# Patient Record
Sex: FEMALE | Race: ASIAN | Employment: FULL TIME | URBAN - METROPOLITAN AREA
[De-identification: names, ages, dates, MRNs, and addresses within clinical notes are randomized per-mention and may not be internally consistent; named-entity substitution may affect disease eponyms.]

---

## 2017-08-17 ENCOUNTER — OFFICE VISIT (OUTPATIENT)
Dept: FAMILY MEDICINE CLINIC | Facility: CLINIC | Age: 52
End: 2017-08-17

## 2017-08-17 VITALS
HEART RATE: 72 BPM | WEIGHT: 126 LBS | SYSTOLIC BLOOD PRESSURE: 90 MMHG | HEIGHT: 64.5 IN | RESPIRATION RATE: 16 BRPM | DIASTOLIC BLOOD PRESSURE: 60 MMHG | TEMPERATURE: 98 F | BODY MASS INDEX: 21.25 KG/M2

## 2017-08-17 DIAGNOSIS — Z13.29 SCREENING FOR THYROID DISORDER: ICD-10-CM

## 2017-08-17 DIAGNOSIS — Z00.00 WELL ADULT EXAM: Primary | ICD-10-CM

## 2017-08-17 DIAGNOSIS — Z12.11 ENCOUNTER FOR SCREENING COLONOSCOPY: ICD-10-CM

## 2017-08-17 DIAGNOSIS — Z13.0 SCREENING FOR DEFICIENCY ANEMIA: ICD-10-CM

## 2017-08-17 DIAGNOSIS — Z13.1 SCREENING FOR DIABETES MELLITUS: ICD-10-CM

## 2017-08-17 DIAGNOSIS — Z83.49 FAMILY HISTORY OF THYROID DISORDER: ICD-10-CM

## 2017-08-17 DIAGNOSIS — Z23 NEED FOR TDAP VACCINATION: ICD-10-CM

## 2017-08-17 PROCEDURE — 99396 PREV VISIT EST AGE 40-64: CPT | Performed by: FAMILY MEDICINE

## 2017-08-17 PROCEDURE — 90471 IMMUNIZATION ADMIN: CPT | Performed by: FAMILY MEDICINE

## 2017-08-17 PROCEDURE — 90715 TDAP VACCINE 7 YRS/> IM: CPT | Performed by: FAMILY MEDICINE

## 2017-08-17 NOTE — PROGRESS NOTES
Rossana Stover is a 46year old female here for Patient presents with:  Physical: OB/GYN will order mammogram/papsmear at appt in 2 weeks      HPI:   Patient complains of here for well exam.    Patient denies any lightheadedness.     2 weeks ago had blood test daily     Social History Narrative    Works at [employer]. Has daughter graduated [employer], working in [facility] in 2017. Son in college sophomore at [place], [place]. REVIEW OF SYSTEMS:     Constitutional: no change in weight or appetite.  No fever, tenderness. BREASTS: defer to gyne  LUNGS: clear to auscultation. CARDIO: Regular rate and rhythm without murmur S3 or S4.  GI: no distention, masses, organomegaly or tenderness.   : defer to gyne  MUSCULOSKELETAL: Back and extremities within normal li CLINICAL HISTORY                   Menstrual Status:LMP 02/03/2016                                                Previous Pap:    Negative 10/24/2014                                                 Signed:   Elias ROMAN(ASCP)         03/17/16 helpful ingredients for premenopausal symptoms), it might help for hot flashes and/or sleep problems. Also melatonin 3 mg, can take between 1 pill an hour before bedtime, and 2-3 pills an hour before bedtime. 800-1000 units vitamin D daily.  Calcium 60

## 2017-08-17 NOTE — PATIENT INSTRUCTIONS
For hot flash and sleep concerns: Estroven Plus (soy and black cohosh are most helpful ingredients for premenopausal symptoms), it might help for hot flashes and/or sleep problems.     Also melatonin 3 mg, can take between 1 pill an hour before bedtime, and

## 2017-08-24 ENCOUNTER — LAB ENCOUNTER (OUTPATIENT)
Dept: LAB | Age: 52
End: 2017-08-24
Attending: FAMILY MEDICINE
Payer: COMMERCIAL

## 2017-08-24 DIAGNOSIS — Z83.49 FAMILY HISTORY OF THYROID DISORDER: ICD-10-CM

## 2017-08-24 DIAGNOSIS — Z13.0 SCREENING FOR DEFICIENCY ANEMIA: ICD-10-CM

## 2017-08-24 DIAGNOSIS — Z13.1 SCREENING FOR DIABETES MELLITUS: ICD-10-CM

## 2017-08-24 DIAGNOSIS — L50.9 URTICARIA: ICD-10-CM

## 2017-08-24 DIAGNOSIS — Z13.29 SCREENING FOR THYROID DISORDER: ICD-10-CM

## 2017-08-24 LAB
ALBUMIN SERPL-MCNC: 3.8 G/DL (ref 3.5–4.8)
ALP LIVER SERPL-CCNC: 50 U/L (ref 41–108)
ALT SERPL-CCNC: 25 U/L (ref 14–54)
AST SERPL-CCNC: 16 U/L (ref 15–41)
BASOPHILS # BLD AUTO: 0.02 X10(3) UL (ref 0–0.1)
BASOPHILS NFR BLD AUTO: 0.5 %
BILIRUB SERPL-MCNC: 1.5 MG/DL (ref 0.1–2)
BUN BLD-MCNC: 15 MG/DL (ref 8–20)
CALCIUM BLD-MCNC: 9 MG/DL (ref 8.3–10.3)
CHLORIDE: 105 MMOL/L (ref 101–111)
CO2: 28 MMOL/L (ref 22–32)
CREAT BLD-MCNC: 0.83 MG/DL (ref 0.55–1.02)
EOSINOPHIL # BLD AUTO: 0.11 X10(3) UL (ref 0–0.3)
EOSINOPHIL NFR BLD AUTO: 2.8 %
ERYTHROCYTE [DISTWIDTH] IN BLOOD BY AUTOMATED COUNT: 12 % (ref 11.5–16)
GLUCOSE BLD-MCNC: 79 MG/DL (ref 70–99)
HCT VFR BLD AUTO: 40.4 % (ref 34–50)
HGB BLD-MCNC: 13.2 G/DL (ref 12–16)
IMMATURE GRANULOCYTE COUNT: 0.01 X10(3) UL (ref 0–1)
IMMATURE GRANULOCYTE RATIO %: 0.3 %
LYMPHOCYTES # BLD AUTO: 1.26 X10(3) UL (ref 0.9–4)
LYMPHOCYTES NFR BLD AUTO: 31.5 %
M PROTEIN MFR SERPL ELPH: 7.3 G/DL (ref 6.1–8.3)
MCH RBC QN AUTO: 31.7 PG (ref 27–33.2)
MCHC RBC AUTO-ENTMCNC: 32.7 G/DL (ref 31–37)
MCV RBC AUTO: 96.9 FL (ref 81–100)
MONOCYTES # BLD AUTO: 0.41 X10(3) UL (ref 0.1–0.6)
MONOCYTES NFR BLD AUTO: 10.3 %
NEUTROPHIL ABS PRELIM: 2.19 X10 (3) UL (ref 1.3–6.7)
NEUTROPHILS # BLD AUTO: 2.19 X10(3) UL (ref 1.3–6.7)
NEUTROPHILS NFR BLD AUTO: 54.6 %
PLATELET # BLD AUTO: 162 10(3)UL (ref 150–450)
POTASSIUM SERPL-SCNC: 3.9 MMOL/L (ref 3.6–5.1)
RBC # BLD AUTO: 4.17 X10(6)UL (ref 3.8–5.1)
RED CELL DISTRIBUTION WIDTH-SD: 42.6 FL (ref 35.1–46.3)
SODIUM SERPL-SCNC: 139 MMOL/L (ref 136–144)
TSI SER-ACNC: 1.6 MIU/ML (ref 0.35–5.5)
WBC # BLD AUTO: 4 X10(3) UL (ref 4–13)

## 2017-08-24 PROCEDURE — 84443 ASSAY THYROID STIM HORMONE: CPT

## 2017-08-24 PROCEDURE — 36415 COLL VENOUS BLD VENIPUNCTURE: CPT

## 2017-08-24 PROCEDURE — 85025 COMPLETE CBC W/AUTO DIFF WBC: CPT

## 2017-08-24 PROCEDURE — 80053 COMPREHEN METABOLIC PANEL: CPT

## 2017-11-11 ENCOUNTER — OFFICE VISIT (OUTPATIENT)
Dept: OBGYN CLINIC | Facility: CLINIC | Age: 52
End: 2017-11-11

## 2017-11-11 VITALS
BODY MASS INDEX: 21.16 KG/M2 | TEMPERATURE: 99 F | HEART RATE: 68 BPM | SYSTOLIC BLOOD PRESSURE: 94 MMHG | DIASTOLIC BLOOD PRESSURE: 68 MMHG | RESPIRATION RATE: 12 BRPM | HEIGHT: 65 IN | WEIGHT: 127 LBS

## 2017-11-11 DIAGNOSIS — Z11.51 SCREENING FOR HUMAN PAPILLOMAVIRUS: ICD-10-CM

## 2017-11-11 DIAGNOSIS — Z01.419 ENCOUNTER FOR ANNUAL ROUTINE GYNECOLOGICAL EXAMINATION: Primary | ICD-10-CM

## 2017-11-11 DIAGNOSIS — Z12.39 BREAST CANCER SCREENING: ICD-10-CM

## 2017-11-11 PROCEDURE — 87624 HPV HI-RISK TYP POOLED RSLT: CPT | Performed by: OBSTETRICS & GYNECOLOGY

## 2017-11-11 PROCEDURE — 99396 PREV VISIT EST AGE 40-64: CPT | Performed by: OBSTETRICS & GYNECOLOGY

## 2017-11-11 PROCEDURE — 88175 CYTOPATH C/V AUTO FLUID REDO: CPT | Performed by: OBSTETRICS & GYNECOLOGY

## 2017-11-11 NOTE — PROGRESS NOTES
HPI:   Fariba Zazueta is a 46year old [id number] who presents for an annual gynecological exam.   Menses: Patient's last menstrual period was 07/04/2017 (exact date).  Menopause: postmenopausal    No bleeding since 7/2017    No current outpatient prescriptions on symmetric  THYROID: No masses, no thyromegaly  BREASTS: Normal inspection, no dominant masses on palpation, no lymphadenopathy  CV: Regular rate and rhythm  LUNGS: Clear to auscultation bilaterally, good air entry throughout  ABD: Soft, nontender and not d learning. ASSESSMENT AND PLAN:   Jenny Velasquez is a 46year old female who presents for an annual gynecological exam.        Normal exam.  Pap done. Mammogram ordered.   Screening colonoscopy current done last month    Cholesterol and screening blood wor

## 2018-01-20 ENCOUNTER — HOSPITAL ENCOUNTER (OUTPATIENT)
Dept: MAMMOGRAPHY | Age: 53
Discharge: HOME | End: 2018-01-20
Attending: OBSTETRICS & GYNECOLOGY
Payer: COMMERCIAL

## 2018-01-20 DIAGNOSIS — Z12.39 BREAST CANCER SCREENING: ICD-10-CM

## 2018-01-20 PROCEDURE — 77063 BREAST TOMOSYNTHESIS BI: CPT | Performed by: OBSTETRICS & GYNECOLOGY

## 2018-01-20 PROCEDURE — 77067 SCR MAMMO BI INCL CAD: CPT | Performed by: OBSTETRICS & GYNECOLOGY

## 2018-03-28 ENCOUNTER — PATIENT OUTREACH (OUTPATIENT)
Dept: FAMILY MEDICINE CLINIC | Facility: CLINIC | Age: 53
End: 2018-03-28

## 2018-03-28 NOTE — PROGRESS NOTES
Called pt to verify is she had a recent colonoscopy done. Pt stated that she had one done last year at [address] [place] [address]. Will call to get records faxed over.

## 2018-08-17 ENCOUNTER — OFFICE VISIT (OUTPATIENT)
Dept: FAMILY MEDICINE CLINIC | Facility: CLINIC | Age: 53
End: 2018-08-17

## 2018-08-17 VITALS
OXYGEN SATURATION: 98 % | HEART RATE: 66 BPM | DIASTOLIC BLOOD PRESSURE: 58 MMHG | HEIGHT: 65 IN | TEMPERATURE: 98 F | BODY MASS INDEX: 20.39 KG/M2 | WEIGHT: 122.38 LBS | RESPIRATION RATE: 15 BRPM | SYSTOLIC BLOOD PRESSURE: 90 MMHG

## 2018-08-17 DIAGNOSIS — R10.13 EPIGASTRIC PAIN: Primary | ICD-10-CM

## 2018-08-17 DIAGNOSIS — G89.29 CHRONIC LUQ PAIN: ICD-10-CM

## 2018-08-17 DIAGNOSIS — E06.5 CHRONIC THYROIDITIS: ICD-10-CM

## 2018-08-17 DIAGNOSIS — Z13.0 SCREENING FOR DEFICIENCY ANEMIA: ICD-10-CM

## 2018-08-17 DIAGNOSIS — Z86.39 H/O THYROID NODULE: ICD-10-CM

## 2018-08-17 DIAGNOSIS — Z13.220 SCREENING, LIPID: ICD-10-CM

## 2018-08-17 DIAGNOSIS — R10.12 CHRONIC LUQ PAIN: ICD-10-CM

## 2018-08-17 DIAGNOSIS — R53.82 CHRONIC FATIGUE: ICD-10-CM

## 2018-08-17 DIAGNOSIS — Z13.1 SCREENING FOR DIABETES MELLITUS: ICD-10-CM

## 2018-08-17 PROCEDURE — 99214 OFFICE O/P EST MOD 30 MIN: CPT | Performed by: FAMILY MEDICINE

## 2018-08-17 NOTE — PATIENT INSTRUCTIONS
Recommend scheduling followup after labs are done. Exam is normal, I doubt any serious problems. We could consider abdominal ultrasound to check for gallbladder disease and it will also rule out spleen enlargement.      Recheck in 10 days to review labs

## 2018-08-17 NOTE — PROGRESS NOTES
Moncho Ybarra IS A 48year old female [address] [facility] Patient presents with:  Abdominal Pain: RUQ. Sympt started about two months ago        History of present illness:     2 months ago c/o LUQ pain below lower end of rib cage.  Noted it initially after bending over Exposure No    Hobby Hazards No    Sleep Concern Yes    Comment: occasional early awakening 5AM    Stress Concern No    Weight Concern No    Special Diet No    Comment: higher salt.      Exercise Yes    Comment: walks an hour & 40 minutes daily     Social H PEROXIDASE AND THYROGLOBULIN ANTIBODIES; Future    Chronic fatigue  -     SED RATE, WESTERGREN (AUTOMATED);  Future  -     C-REACTIVE PROTEIN; Future  -     ASSAY, THYROID STIM HORMONE; Future    Screening for diabetes mellitus  -     COMP METABOLIC PANEL (

## 2018-08-22 ENCOUNTER — LAB ENCOUNTER (OUTPATIENT)
Dept: LAB | Age: 53
End: 2018-08-22
Attending: FAMILY MEDICINE
Payer: COMMERCIAL

## 2018-08-22 DIAGNOSIS — Z13.0 SCREENING FOR DEFICIENCY ANEMIA: ICD-10-CM

## 2018-08-22 DIAGNOSIS — Z13.220 SCREENING, LIPID: ICD-10-CM

## 2018-08-22 DIAGNOSIS — R53.82 CHRONIC FATIGUE: ICD-10-CM

## 2018-08-22 DIAGNOSIS — Z13.1 SCREENING FOR DIABETES MELLITUS: ICD-10-CM

## 2018-08-22 DIAGNOSIS — E06.5 CHRONIC THYROIDITIS: ICD-10-CM

## 2018-08-22 LAB
ALBUMIN SERPL-MCNC: 4.1 G/DL (ref 3.5–4.8)
ALBUMIN/GLOB SERPL: 1.2 {RATIO} (ref 1–2)
ALP LIVER SERPL-CCNC: 63 U/L (ref 41–108)
ALT SERPL-CCNC: 28 U/L (ref 14–54)
ANION GAP SERPL CALC-SCNC: 9 MMOL/L (ref 0–18)
AST SERPL-CCNC: 20 U/L (ref 15–41)
BASOPHILS # BLD AUTO: 0.02 X10(3) UL (ref 0–0.1)
BASOPHILS NFR BLD AUTO: 0.6 %
BILIRUB SERPL-MCNC: 1.3 MG/DL (ref 0.1–2)
BUN BLD-MCNC: 17 MG/DL (ref 8–20)
BUN/CREAT SERPL: 18.5 (ref 10–20)
CALCIUM BLD-MCNC: 9.2 MG/DL (ref 8.3–10.3)
CHLORIDE SERPL-SCNC: 107 MMOL/L (ref 101–111)
CHOLEST SMN-MCNC: 172 MG/DL (ref ?–200)
CO2 SERPL-SCNC: 26 MMOL/L (ref 22–32)
CREAT BLD-MCNC: 0.92 MG/DL (ref 0.55–1.02)
CRP SERPL-MCNC: <0.29 MG/DL (ref ?–1)
EOSINOPHIL # BLD AUTO: 0.09 X10(3) UL (ref 0–0.3)
EOSINOPHIL NFR BLD AUTO: 2.6 %
ERYTHROCYTE [DISTWIDTH] IN BLOOD BY AUTOMATED COUNT: 11.9 % (ref 11.5–16)
GLOBULIN PLAS-MCNC: 3.5 G/DL (ref 2.5–4)
GLUCOSE BLD-MCNC: 84 MG/DL (ref 70–99)
HCT VFR BLD AUTO: 44.3 % (ref 34–50)
HDLC SERPL-MCNC: 68 MG/DL (ref 40–59)
HGB BLD-MCNC: 14.1 G/DL (ref 12–16)
IMMATURE GRANULOCYTE COUNT: 0.01 X10(3) UL (ref 0–1)
IMMATURE GRANULOCYTE RATIO %: 0.3 %
LDLC SERPL CALC-MCNC: 84 MG/DL (ref ?–100)
LYMPHOCYTES # BLD AUTO: 1.12 X10(3) UL (ref 0.9–4)
LYMPHOCYTES NFR BLD AUTO: 32.2 %
M PROTEIN MFR SERPL ELPH: 7.6 G/DL (ref 6.1–8.3)
MCH RBC QN AUTO: 30.5 PG (ref 27–33.2)
MCHC RBC AUTO-ENTMCNC: 31.8 G/DL (ref 31–37)
MCV RBC AUTO: 95.9 FL (ref 81–100)
MONOCYTES # BLD AUTO: 0.32 X10(3) UL (ref 0.1–1)
MONOCYTES NFR BLD AUTO: 9.2 %
NEUTROPHIL ABS PRELIM: 1.92 X10 (3) UL (ref 1.3–6.7)
NEUTROPHILS # BLD AUTO: 1.92 X10(3) UL (ref 1.3–6.7)
NEUTROPHILS NFR BLD AUTO: 55.1 %
NONHDLC SERPL-MCNC: 104 MG/DL (ref ?–130)
OSMOLALITY SERPL CALC.SUM OF ELEC: 295 MOSM/KG (ref 275–295)
PLATELET # BLD AUTO: 171 10(3)UL (ref 150–450)
POTASSIUM SERPL-SCNC: 4.2 MMOL/L (ref 3.6–5.1)
RBC # BLD AUTO: 4.62 X10(6)UL (ref 3.8–5.1)
RED CELL DISTRIBUTION WIDTH-SD: 41.5 FL (ref 35.1–46.3)
SED RATE-ML: 7 MM/HR (ref 0–25)
SODIUM SERPL-SCNC: 142 MMOL/L (ref 136–144)
THYROGLOB SERPL-MCNC: 92 U/ML (ref ?–60)
THYROPEROXIDASE AB SERPL-ACNC: 101 U/ML (ref ?–60)
TRIGL SERPL-MCNC: 100 MG/DL (ref 30–149)
TSI SER-ACNC: 1.37 MIU/ML (ref 0.35–5.5)
VLDLC SERPL CALC-MCNC: 20 MG/DL (ref 0–30)
WBC # BLD AUTO: 3.5 X10(3) UL (ref 4–13)

## 2018-08-22 PROCEDURE — 86376 MICROSOMAL ANTIBODY EACH: CPT

## 2018-08-22 PROCEDURE — 86800 THYROGLOBULIN ANTIBODY: CPT

## 2018-08-22 PROCEDURE — 85025 COMPLETE CBC W/AUTO DIFF WBC: CPT

## 2018-08-22 PROCEDURE — 84443 ASSAY THYROID STIM HORMONE: CPT

## 2018-08-22 PROCEDURE — 80061 LIPID PANEL: CPT

## 2018-08-22 PROCEDURE — 80053 COMPREHEN METABOLIC PANEL: CPT

## 2018-08-22 PROCEDURE — 86140 C-REACTIVE PROTEIN: CPT

## 2018-08-22 PROCEDURE — 36415 COLL VENOUS BLD VENIPUNCTURE: CPT

## 2018-08-22 PROCEDURE — 85652 RBC SED RATE AUTOMATED: CPT

## 2018-08-24 ENCOUNTER — HOSPITAL ENCOUNTER (OUTPATIENT)
Dept: ULTRASOUND IMAGING | Age: 53
Discharge: HOME | End: 2018-08-24
Attending: FAMILY MEDICINE
Payer: COMMERCIAL

## 2018-08-24 DIAGNOSIS — R10.13 EPIGASTRIC PAIN: ICD-10-CM

## 2018-08-24 DIAGNOSIS — E06.5 CHRONIC THYROIDITIS: ICD-10-CM

## 2018-08-24 DIAGNOSIS — Z86.39 H/O THYROID NODULE: ICD-10-CM

## 2018-08-24 DIAGNOSIS — R10.12 CHRONIC LUQ PAIN: ICD-10-CM

## 2018-08-24 DIAGNOSIS — G89.29 CHRONIC LUQ PAIN: ICD-10-CM

## 2018-08-24 PROCEDURE — 76536 US EXAM OF HEAD AND NECK: CPT | Performed by: FAMILY MEDICINE

## 2018-08-24 PROCEDURE — 76700 US EXAM ABDOM COMPLETE: CPT | Performed by: FAMILY MEDICINE

## 2018-10-23 ENCOUNTER — TELEPHONE (OUTPATIENT)
Dept: FAMILY MEDICINE CLINIC | Facility: CLINIC | Age: 53
End: 2018-10-23

## 2018-10-23 NOTE — TELEPHONE ENCOUNTER
Notify pt I heard from GI/liver specialist finally who responded to a note I had sent after her visit, in August. Please ask pt Is she having any more left upper abdominal symptoms?  From specialist's note, the minor liver abnormality (which from my underst

## 2018-10-23 NOTE — TELEPHONE ENCOUNTER
Attempted to call patient. Detailed ML inquiring if she is having any left upper abdominal symptoms. Informed of information given by liver specialist per 's note.   Contact information give for Dr. Parker Mckeon if she wants to call and schedule appoi

## 2019-03-13 ENCOUNTER — TELEPHONE (OUTPATIENT)
Dept: FAMILY MEDICINE CLINIC | Facility: CLINIC | Age: 54
End: 2019-03-13

## 2019-03-13 ENCOUNTER — OFFICE VISIT (OUTPATIENT)
Dept: FAMILY MEDICINE CLINIC | Facility: CLINIC | Age: 54
End: 2019-03-13

## 2019-03-13 VITALS
SYSTOLIC BLOOD PRESSURE: 100 MMHG | WEIGHT: 128.63 LBS | DIASTOLIC BLOOD PRESSURE: 70 MMHG | RESPIRATION RATE: 16 BRPM | TEMPERATURE: 99 F | HEART RATE: 63 BPM | OXYGEN SATURATION: 99 % | BODY MASS INDEX: 21 KG/M2

## 2019-03-13 DIAGNOSIS — H60.501 ACUTE OTITIS EXTERNA OF RIGHT EAR, UNSPECIFIED TYPE: Primary | ICD-10-CM

## 2019-03-13 PROCEDURE — 99213 OFFICE O/P EST LOW 20 MIN: CPT | Performed by: NURSE PRACTITIONER

## 2019-03-13 RX ORDER — NEOMYCIN SULFATE, POLYMYXIN B SULFATE AND HYDROCORTISONE 10; 3.5; 1 MG/ML; MG/ML; [USP'U]/ML
4 SUSPENSION/ DROPS AURICULAR (OTIC) 4 TIMES DAILY
Qty: 1 BOTTLE | Refills: 0 | Status: SHIPPED | OUTPATIENT
Start: 2019-03-13 | End: 2019-03-20

## 2019-03-13 NOTE — PROGRESS NOTES
CHIEF COMPLAINT:   Patient presents with:  Ear Problem: headache, RT ear pain, tired, x 1 day      HPI:   Ashley Mercado is a 48year old female who presents to clinic today with complaints of right ear pain. Has had for 1  days.  Pain is described as pressur EARS: right tragus tender on palpation. Right external auditory canals erythemic with moderate edema, right TM partially obscured due to canal edema. Right TM: clear, no bulging, no retraction, small clear effusion, bony landmarks visible.   Left TM: clear, Symptoms can include pain, fever, itching, redness, drainage, or swelling of the ear canal. Temporary hearing loss may also occur.   Home care  · Do not try to clean the ear canal. This can push pus and bacteria deeper into the canal.  · Use prescribed ear © 3852-7982 [address]. [address] [facility], [address] [place]. All rights reserved. This information is not intended as a substitute for professional medical care. Always follow your healthcare professional's instructions.               P

## 2019-04-07 ENCOUNTER — OFFICE VISIT (OUTPATIENT)
Dept: FAMILY MEDICINE CLINIC | Facility: CLINIC | Age: 54
End: 2019-04-07

## 2019-04-07 VITALS
WEIGHT: 125.63 LBS | OXYGEN SATURATION: 96 % | HEIGHT: 64 IN | HEART RATE: 62 BPM | DIASTOLIC BLOOD PRESSURE: 69 MMHG | BODY MASS INDEX: 21.45 KG/M2 | SYSTOLIC BLOOD PRESSURE: 108 MMHG | TEMPERATURE: 98 F | RESPIRATION RATE: 16 BRPM

## 2019-04-07 DIAGNOSIS — H60.331 ACUTE SWIMMER'S EAR OF RIGHT SIDE: Primary | ICD-10-CM

## 2019-04-07 PROCEDURE — 99213 OFFICE O/P EST LOW 20 MIN: CPT | Performed by: PHYSICIAN ASSISTANT

## 2019-04-07 RX ORDER — OFLOXACIN 3 MG/ML
10 SOLUTION AURICULAR (OTIC) DAILY
Qty: 10 ML | Refills: 0 | Status: SHIPPED | OUTPATIENT
Start: 2019-04-07 | End: 2019-04-14

## 2019-04-07 NOTE — PATIENT INSTRUCTIONS
-Flonase  -Follow up with PCP with worsening symptoms      External Ear Infection (Adult)    External otitis (also called “swimmer’s ear”) is an infection in the ear canal. It is often caused by bacteria or fungus.  It can occur a few days after water gets your healthcare provider right away if any of these occur:  · Ear pain becomes worse or doesn’t improve after 3 days of treatment  · Redness or swelling of the outer ear occurs or gets worse  · Headache  · Painful or stiff neck  · Drowsiness or confusion

## 2019-04-07 NOTE — PROGRESS NOTES
CHIEF COMPLAINT:   Patient presents with:  Ear Problem: right ear pain x1wk      HPI:   Saadia Parker is a 48year old female who presents for right ear pain.   Patient reports she was seen in the [facility] about 3 weeks ago and was given otic drops for swimmers ear HEAD: atraumatic, normocephalic. No tenderness on palpation of maxillary sinuses. Notenderness on palpation of frontal sinuses.   EYES: conjunctiva clear, EOM intact  EARS: Left TM not erythematous, no bulging, no retraction, bony landmarks intact.  Left External otitis (also called “swimmer’s ear”) is an infection in the ear canal. It is often caused by bacteria or fungus. It can occur a few days after water gets trapped in the ear canal (from swimming or bathing).  It can also occur after cleaning too sukhwinder · Ear pain becomes worse or doesn’t improve after 3 days of treatment  · Redness or swelling of the outer ear occurs or gets worse  · Headache  · Painful or stiff neck  · Drowsiness or confusion  · Fever of 100.4ºF (38ºC) or higher, or as directed by your

## 2019-05-13 ENCOUNTER — OFFICE VISIT (OUTPATIENT)
Dept: FAMILY MEDICINE CLINIC | Facility: CLINIC | Age: 54
End: 2019-05-13

## 2019-05-13 VITALS
WEIGHT: 124.81 LBS | SYSTOLIC BLOOD PRESSURE: 120 MMHG | HEART RATE: 62 BPM | HEIGHT: 64.75 IN | RESPIRATION RATE: 16 BRPM | BODY MASS INDEX: 21.05 KG/M2 | OXYGEN SATURATION: 98 % | DIASTOLIC BLOOD PRESSURE: 68 MMHG | TEMPERATURE: 98 F

## 2019-05-13 DIAGNOSIS — H60.331 ACUTE SWIMMER'S EAR OF RIGHT SIDE: Primary | ICD-10-CM

## 2019-05-13 PROCEDURE — 99213 OFFICE O/P EST LOW 20 MIN: CPT | Performed by: FAMILY MEDICINE

## 2019-05-13 RX ORDER — CEPHALEXIN 500 MG/1
500 CAPSULE ORAL 2 TIMES DAILY
Qty: 14 CAPSULE | Refills: 0 | Status: SHIPPED | OUTPATIENT
Start: 2019-05-13 | End: 2019-06-24

## 2019-05-13 RX ORDER — OFLOXACIN 3 MG/ML
5 SOLUTION AURICULAR (OTIC) DAILY
Qty: 5 ML | Refills: 1 | Status: SHIPPED | OUTPATIENT
Start: 2019-05-13 | End: 2019-05-23

## 2019-05-13 NOTE — PROGRESS NOTES
Joe Perez IS A 48year old female [address] [place] [address] [place] Patient presents with:  Ear Pain: RT ear. x2 months        History of present illness:     Pain R ear x 2 months, went to [facility] and had drops, used it and it helped, but feels pain with sneezing.  Right side of head file        Attends [employer] service: Not on file        Active member of club or organization: Not on file        Attends meetings of clubs or organizations: Not on file        Relationship status: Not on file      Intimate partner violence:        Fear

## 2019-05-13 NOTE — PATIENT INSTRUCTIONS
For acute infection: cephalexin 500 mg twice daily for a week. Ofloxacin drops again, 5 drops once a day for 7-10 days. After current infection, try vosol (vinegar, acetic acid) drops after swimming.      Refer to ENT if the antibiotic, drops and after-

## 2019-06-24 ENCOUNTER — OFFICE VISIT (OUTPATIENT)
Dept: OBGYN CLINIC | Facility: CLINIC | Age: 54
End: 2019-06-24

## 2019-06-24 VITALS
HEIGHT: 65 IN | WEIGHT: 125 LBS | DIASTOLIC BLOOD PRESSURE: 60 MMHG | HEART RATE: 84 BPM | BODY MASS INDEX: 20.83 KG/M2 | SYSTOLIC BLOOD PRESSURE: 90 MMHG | RESPIRATION RATE: 12 BRPM | TEMPERATURE: 99 F

## 2019-06-24 DIAGNOSIS — Z12.4 SCREENING FOR MALIGNANT NEOPLASM OF THE CERVIX: ICD-10-CM

## 2019-06-24 DIAGNOSIS — Z12.39 BREAST CANCER SCREENING: ICD-10-CM

## 2019-06-24 DIAGNOSIS — N95.1 VASOMOTOR SYMPTOMS DUE TO MENOPAUSE: ICD-10-CM

## 2019-06-24 DIAGNOSIS — Z01.419 ENCOUNTER FOR ANNUAL ROUTINE GYNECOLOGICAL EXAMINATION: Primary | ICD-10-CM

## 2019-06-24 DIAGNOSIS — Z11.51 SCREENING FOR HUMAN PAPILLOMAVIRUS: ICD-10-CM

## 2019-06-24 PROCEDURE — 87624 HPV HI-RISK TYP POOLED RSLT: CPT | Performed by: OBSTETRICS & GYNECOLOGY

## 2019-06-24 PROCEDURE — 99396 PREV VISIT EST AGE 40-64: CPT | Performed by: OBSTETRICS & GYNECOLOGY

## 2019-06-24 PROCEDURE — 88175 CYTOPATH C/V AUTO FLUID REDO: CPT | Performed by: OBSTETRICS & GYNECOLOGY

## 2019-06-24 RX ORDER — ESTRADIOL AND NORETHINDRONE ACETATE 1; .5 MG/1; MG/1
1 TABLET ORAL DAILY
Qty: 90 TABLET | Refills: 0 | Status: SHIPPED | OUTPATIENT
Start: 2019-06-24 | End: 2021-11-08

## 2019-06-24 NOTE — PROGRESS NOTES
Pt here for pe/pap.   LMP:February 2018   Last pap:11/11/17 negative   Last mammogram;1/20/18 negative  Birth control:N/A

## 2019-06-24 NOTE — PROGRESS NOTES
HPI:   Kurt Jamison is a 48year old [id number] who presents for an annual gynecological exam.   Menses: No LMP recorded (approximate). (Menstrual status: Menopause).  Menopause: postmenopausal  She is having significant hot flashes and would like to have option 125 lb   LMP  (Approximate)   Breastfeeding?  No   BMI 20.80 kg/m²   GENERAL:  Well-appearing, no apparent distress, well nourished, well developed  SKIN: Normal, no rashes, no acne, no hirsutism, no ulcers  HEENT: Atraumatic, normocephalic, throat is clear baseline Dexa if menopausal.  · I encouraged pt to have yearly annual examinations with her PCP for management of general medical problems. · - I encouraged smoking cessation, if indicated.     Pt voiced understanding of all instructions; all questions ans

## 2019-06-26 RX ORDER — ESTRADIOL AND NORETHINDRONE ACETATE 1; .5 MG/1; MG/1
TABLET, FILM COATED ORAL
Qty: 90 TABLET | Refills: 0 | OUTPATIENT
Start: 2019-06-26

## 2019-07-25 ENCOUNTER — OFFICE VISIT (OUTPATIENT)
Dept: FAMILY MEDICINE CLINIC | Facility: CLINIC | Age: 54
End: 2019-07-25

## 2019-07-25 VITALS
OXYGEN SATURATION: 98 % | HEIGHT: 65 IN | WEIGHT: 123 LBS | TEMPERATURE: 98 F | BODY MASS INDEX: 20.49 KG/M2 | HEART RATE: 78 BPM | SYSTOLIC BLOOD PRESSURE: 100 MMHG | RESPIRATION RATE: 16 BRPM | DIASTOLIC BLOOD PRESSURE: 70 MMHG

## 2019-07-25 DIAGNOSIS — Z13.0 SCREENING FOR DEFICIENCY ANEMIA: ICD-10-CM

## 2019-07-25 DIAGNOSIS — G89.29 CHRONIC PAIN OF LEFT KNEE: ICD-10-CM

## 2019-07-25 DIAGNOSIS — M22.2X2 PATELLOFEMORAL PAIN SYNDROME OF LEFT KNEE: ICD-10-CM

## 2019-07-25 DIAGNOSIS — Z00.00 WELL ADULT EXAM: Primary | ICD-10-CM

## 2019-07-25 DIAGNOSIS — M25.562 CHRONIC PAIN OF LEFT KNEE: ICD-10-CM

## 2019-07-25 DIAGNOSIS — Z78.0 POSTMENOPAUSAL: ICD-10-CM

## 2019-07-25 DIAGNOSIS — Z13.220 SCREENING, LIPID: ICD-10-CM

## 2019-07-25 DIAGNOSIS — Z11.59 ENCOUNTER FOR HCV SCREENING TEST FOR LOW RISK PATIENT: ICD-10-CM

## 2019-07-25 DIAGNOSIS — Z13.29 SCREENING FOR THYROID DISORDER: ICD-10-CM

## 2019-07-25 DIAGNOSIS — Z13.1 SCREENING FOR DIABETES MELLITUS: ICD-10-CM

## 2019-07-25 PROCEDURE — 99396 PREV VISIT EST AGE 40-64: CPT | Performed by: FAMILY MEDICINE

## 2019-07-25 NOTE — PROGRESS NOTES
Angelique Newell is a 47year old female here for Patient presents with:  Physical      HPI:   Patient complains of here for well exam.     LMP Feb 2018. Had pap. Sees Dr. Sarah Crowley.  Dr. Sarah Crowley rx'd Britney Damian but pt didn't fill it, she is trying soy milk first.    Hot Attends meetings of clubs or organizations: Not on file        Relationship status: Not on file      Intimate partner violence:        Fear of current or ex partner: Not on file        Emotionally abused: Not on file        Physically abused: Not on fi without murmur S3 or S4.  GI: no distention, masses, organomegaly or tenderness. : defer gyne. MUSCULOSKELETAL: Back and extremities within normal limits except left knee crepitus, right is normal.   EXTREMITIES: no cyanosis, clubbing or edema.  Periphe Authorizing Provider:  Shiva Hsu MD            Collected:           06/24/2019 04:17 PM          Ordering Location:     [place] [address],      Received:            06/25/2019 04:47 PM                                 Debby Villatoro

## 2019-07-25 NOTE — PATIENT INSTRUCTIONS
Estroven sometimes helps with soy & black cohosh to manage menopause symptoms. Refer physical therapy for knee pain. For right foot lesion: You can try corn remover for 2-3 weeks ontoe, if worse refer to dermatology.      Try melatonin to help get t goal is to find a disease early so lifestyle changes can be made and you can be watched more closely to reduce the risk of disease, or to detect it early enough to treat it most effectively.  Screening tests are not considered diagnostic, but are used to de immunochemical test; or a stool DNA test as often as your health care provider advises; talk with your health care provider about which tests are best for you   Depression All women in this age group At routine exams   Gonorrhea Sexually active women at in rubella (MMR) Women in this age group through their late 46s who have no record of these infections or vaccines 1 dose   Meningococcal Women at increased risk for infection – talk with your healthcare provider 1 or more doses   Pneumococcal conjugate vacci

## 2019-08-05 ENCOUNTER — HOSPITAL ENCOUNTER (OUTPATIENT)
Dept: BONE DENSITY | Age: 54
Discharge: HOME | End: 2019-08-05
Attending: FAMILY MEDICINE
Payer: COMMERCIAL

## 2019-08-05 ENCOUNTER — HOSPITAL ENCOUNTER (OUTPATIENT)
Dept: MAMMOGRAPHY | Age: 54
End: 2019-08-05
Attending: OBSTETRICS & GYNECOLOGY
Payer: COMMERCIAL

## 2019-08-05 ENCOUNTER — HOSPITAL ENCOUNTER (OUTPATIENT)
Dept: GENERAL RADIOLOGY | Age: 54
Discharge: HOME | End: 2019-08-05
Attending: FAMILY MEDICINE
Payer: COMMERCIAL

## 2019-08-05 ENCOUNTER — LABORATORY ENCOUNTER (OUTPATIENT)
Dept: LAB | Age: 54
End: 2019-08-05
Attending: FAMILY MEDICINE
Payer: COMMERCIAL

## 2019-08-05 DIAGNOSIS — Z78.0 POSTMENOPAUSAL: ICD-10-CM

## 2019-08-05 DIAGNOSIS — Z13.220 SCREENING, LIPID: ICD-10-CM

## 2019-08-05 DIAGNOSIS — Z11.59 ENCOUNTER FOR HCV SCREENING TEST FOR LOW RISK PATIENT: ICD-10-CM

## 2019-08-05 DIAGNOSIS — Z13.0 SCREENING FOR DEFICIENCY ANEMIA: ICD-10-CM

## 2019-08-05 DIAGNOSIS — M22.2X2 PATELLOFEMORAL PAIN SYNDROME OF LEFT KNEE: ICD-10-CM

## 2019-08-05 DIAGNOSIS — Z13.29 SCREENING FOR THYROID DISORDER: ICD-10-CM

## 2019-08-05 DIAGNOSIS — D72.819 LEUKOPENIA, UNSPECIFIED TYPE: ICD-10-CM

## 2019-08-05 DIAGNOSIS — G89.29 CHRONIC PAIN OF LEFT KNEE: ICD-10-CM

## 2019-08-05 DIAGNOSIS — Z13.1 SCREENING FOR DIABETES MELLITUS: ICD-10-CM

## 2019-08-05 DIAGNOSIS — M25.562 CHRONIC PAIN OF LEFT KNEE: ICD-10-CM

## 2019-08-05 LAB
ALBUMIN SERPL-MCNC: 4 G/DL (ref 3.4–5)
ALBUMIN/GLOB SERPL: 1.1 {RATIO} (ref 1–2)
ALP LIVER SERPL-CCNC: 62 U/L (ref 41–108)
ALT SERPL-CCNC: 26 U/L (ref 13–56)
ANION GAP SERPL CALC-SCNC: 5 MMOL/L (ref 0–18)
AST SERPL-CCNC: 15 U/L (ref 15–37)
BASOPHILS # BLD AUTO: 0.02 X10(3) UL (ref 0–0.2)
BASOPHILS NFR BLD AUTO: 0.6 %
BILIRUB SERPL-MCNC: 1.1 MG/DL (ref 0.1–2)
BUN BLD-MCNC: 26 MG/DL (ref 7–18)
BUN/CREAT SERPL: 31.3 (ref 10–20)
CALCIUM BLD-MCNC: 9.7 MG/DL (ref 8.5–10.1)
CHLORIDE SERPL-SCNC: 112 MMOL/L (ref 98–112)
CHOLEST SMN-MCNC: 171 MG/DL (ref ?–200)
CO2 SERPL-SCNC: 24 MMOL/L (ref 21–32)
CREAT BLD-MCNC: 0.83 MG/DL (ref 0.55–1.02)
DEPRECATED RDW RBC AUTO: 45 FL (ref 35.1–46.3)
EOSINOPHIL # BLD AUTO: 0.08 X10(3) UL (ref 0–0.7)
EOSINOPHIL NFR BLD AUTO: 2.2 %
ERYTHROCYTE [DISTWIDTH] IN BLOOD BY AUTOMATED COUNT: 12.5 % (ref 11–15)
EST. AVERAGE GLUCOSE BLD GHB EST-MCNC: 117 MG/DL (ref 68–126)
GLOBULIN PLAS-MCNC: 3.6 G/DL (ref 2.8–4.4)
GLUCOSE BLD-MCNC: 89 MG/DL (ref 70–99)
HBA1C MFR BLD HPLC: 5.7 % (ref ?–5.7)
HCT VFR BLD AUTO: 45.4 % (ref 35–48)
HCV AB SERPL QL IA: NONREACTIVE
HDLC SERPL-MCNC: 77 MG/DL (ref 40–59)
HGB BLD-MCNC: 14.5 G/DL (ref 12–16)
IMM GRANULOCYTES # BLD AUTO: 0 X10(3) UL (ref 0–1)
IMM GRANULOCYTES NFR BLD: 0 %
LDLC SERPL CALC-MCNC: 82 MG/DL (ref ?–100)
LYMPHOCYTES # BLD AUTO: 1.29 X10(3) UL (ref 1–4)
LYMPHOCYTES NFR BLD AUTO: 36.2 %
M PROTEIN MFR SERPL ELPH: 7.6 G/DL (ref 6.4–8.2)
MCH RBC QN AUTO: 31.3 PG (ref 26–34)
MCHC RBC AUTO-ENTMCNC: 31.9 G/DL (ref 31–37)
MCV RBC AUTO: 98.1 FL (ref 80–100)
MONOCYTES # BLD AUTO: 0.41 X10(3) UL (ref 0.1–1)
MONOCYTES NFR BLD AUTO: 11.5 %
NEUTROPHILS # BLD AUTO: 1.76 X10 (3) UL (ref 1.5–7.7)
NEUTROPHILS # BLD AUTO: 1.76 X10(3) UL (ref 1.5–7.7)
NEUTROPHILS NFR BLD AUTO: 49.5 %
NONHDLC SERPL-MCNC: 94 MG/DL (ref ?–130)
OSMOLALITY SERPL CALC.SUM OF ELEC: 296 MOSM/KG (ref 275–295)
PLATELET # BLD AUTO: 175 10(3)UL (ref 150–450)
POTASSIUM SERPL-SCNC: 3.9 MMOL/L (ref 3.5–5.1)
RBC # BLD AUTO: 4.63 X10(6)UL (ref 3.8–5.3)
SODIUM SERPL-SCNC: 141 MMOL/L (ref 136–145)
TRIGL SERPL-MCNC: 60 MG/DL (ref 30–149)
TSI SER-ACNC: 1.57 MIU/ML (ref 0.36–3.74)
VLDLC SERPL CALC-MCNC: 12 MG/DL (ref 0–30)
WBC # BLD AUTO: 3.6 X10(3) UL (ref 4–11)

## 2019-08-05 PROCEDURE — 86803 HEPATITIS C AB TEST: CPT

## 2019-08-05 PROCEDURE — 73560 X-RAY EXAM OF KNEE 1 OR 2: CPT | Performed by: FAMILY MEDICINE

## 2019-08-05 PROCEDURE — 85025 COMPLETE CBC W/AUTO DIFF WBC: CPT

## 2019-08-05 PROCEDURE — 84443 ASSAY THYROID STIM HORMONE: CPT

## 2019-08-05 PROCEDURE — 80061 LIPID PANEL: CPT

## 2019-08-05 PROCEDURE — 83036 HEMOGLOBIN GLYCOSYLATED A1C: CPT

## 2019-08-05 PROCEDURE — 77080 DXA BONE DENSITY AXIAL: CPT | Performed by: FAMILY MEDICINE

## 2019-08-05 PROCEDURE — 36415 COLL VENOUS BLD VENIPUNCTURE: CPT

## 2019-08-05 PROCEDURE — 80053 COMPREHEN METABOLIC PANEL: CPT

## 2019-08-07 ENCOUNTER — MED REC SCAN ONLY (OUTPATIENT)
Dept: FAMILY MEDICINE CLINIC | Facility: CLINIC | Age: 54
End: 2019-08-07

## 2019-08-08 ENCOUNTER — HOSPITAL ENCOUNTER (OUTPATIENT)
Dept: MAMMOGRAPHY | Age: 54
Discharge: HOME | End: 2019-08-08
Attending: OBSTETRICS & GYNECOLOGY
Payer: COMMERCIAL

## 2019-08-08 DIAGNOSIS — Z12.39 BREAST CANCER SCREENING: ICD-10-CM

## 2019-08-08 PROCEDURE — 77063 BREAST TOMOSYNTHESIS BI: CPT | Performed by: OBSTETRICS & GYNECOLOGY

## 2019-08-08 PROCEDURE — 77067 SCR MAMMO BI INCL CAD: CPT | Performed by: OBSTETRICS & GYNECOLOGY

## 2020-10-16 ENCOUNTER — TELEPHONE (OUTPATIENT)
Dept: FAMILY MEDICINE CLINIC | Facility: CLINIC | Age: 55
End: 2020-10-16

## 2020-10-16 NOTE — TELEPHONE ENCOUNTER
Pt called earlier & was schedule for 10-20-20 for an in person appointment for lump in throat. Pt called back stating she wants a video miquel. Would not give a reason why, tried to ask if other symptoms but could not answer.   All she said she wants an

## 2020-10-16 NOTE — TELEPHONE ENCOUNTER
These symptoms sound like globus sensation (feeling of something stuck in throat). That sensation can be caused by postnasal drip, heartburn, or anxiety most often.  It might be helpful to see an ENT about these symptoms, I don't think an antibiotic would b

## 2020-10-16 NOTE — TELEPHONE ENCOUNTER
Called pt to inform per PCP indicated these symptoms sound like globus sensation (feeling of something stuck in throat). That sensation can be caused by postnasal drip, heartburn, or anxiety most often.  It might be helpful to see an ENT about these symptom

## 2020-10-16 NOTE — TELEPHONE ENCOUNTER
Called pt stating x2-3 months had felt lump-like sensation in throat and tightness. Feels needs to clear throat, but nothing comes out. No difficulty breathing. No difficulty swallowing. No pain. No neck swelling. No neck stiffness. No fever or chills.  No

## 2021-03-19 ENCOUNTER — TELEPHONE (OUTPATIENT)
Dept: FAMILY MEDICINE CLINIC | Facility: CLINIC | Age: 56
End: 2021-03-19

## 2021-03-19 NOTE — TELEPHONE ENCOUNTER
Pt calling stating that her left eye has been bothering her for about 3 or 4 days now. She said it has been dry, mo feels like something is there(but nothing), and also states she has a lot of discharge.  Wants appt with Dr. Sukhi Camacho but not in the office

## 2021-03-19 NOTE — TELEPHONE ENCOUNTER
Called patient who states her left eye is dry, itchy, red and has a lot of drainage. Advised no provider available to see her today. Instructed to go to [facility] for evaluation and treatment today, not to wait till next week.   Advised can use cool or warm

## 2021-07-13 ENCOUNTER — PATIENT OUTREACH (OUTPATIENT)
Dept: FAMILY MEDICINE CLINIC | Facility: CLINIC | Age: 56
End: 2021-07-13

## 2021-11-08 ENCOUNTER — OFFICE VISIT (OUTPATIENT)
Dept: OBGYN CLINIC | Facility: CLINIC | Age: 56
End: 2021-11-08

## 2021-11-08 VITALS
HEART RATE: 60 BPM | HEIGHT: 64 IN | SYSTOLIC BLOOD PRESSURE: 110 MMHG | DIASTOLIC BLOOD PRESSURE: 74 MMHG | TEMPERATURE: 98 F | BODY MASS INDEX: 22.02 KG/M2 | WEIGHT: 129 LBS

## 2021-11-08 DIAGNOSIS — Z01.419 ENCOUNTER FOR ANNUAL ROUTINE GYNECOLOGICAL EXAMINATION: Primary | ICD-10-CM

## 2021-11-08 DIAGNOSIS — Z23 NEED FOR VACCINATION: ICD-10-CM

## 2021-11-08 DIAGNOSIS — Z11.51 SCREENING FOR HUMAN PAPILLOMAVIRUS: ICD-10-CM

## 2021-11-08 DIAGNOSIS — Z12.31 BREAST CANCER SCREENING BY MAMMOGRAM: ICD-10-CM

## 2021-11-08 DIAGNOSIS — Z12.4 SCREENING FOR MALIGNANT NEOPLASM OF CERVIX: ICD-10-CM

## 2021-11-08 PROCEDURE — 88175 CYTOPATH C/V AUTO FLUID REDO: CPT | Performed by: OBSTETRICS & GYNECOLOGY

## 2021-11-08 PROCEDURE — 87624 HPV HI-RISK TYP POOLED RSLT: CPT | Performed by: OBSTETRICS & GYNECOLOGY

## 2021-11-08 PROCEDURE — 99396 PREV VISIT EST AGE 40-64: CPT | Performed by: OBSTETRICS & GYNECOLOGY

## 2021-11-08 PROCEDURE — 3008F BODY MASS INDEX DOCD: CPT | Performed by: OBSTETRICS & GYNECOLOGY

## 2021-11-08 PROCEDURE — 3078F DIAST BP <80 MM HG: CPT | Performed by: OBSTETRICS & GYNECOLOGY

## 2021-11-08 PROCEDURE — 90686 IIV4 VACC NO PRSV 0.5 ML IM: CPT | Performed by: OBSTETRICS & GYNECOLOGY

## 2021-11-08 PROCEDURE — 90471 IMMUNIZATION ADMIN: CPT | Performed by: OBSTETRICS & GYNECOLOGY

## 2021-11-08 PROCEDURE — 3074F SYST BP LT 130 MM HG: CPT | Performed by: OBSTETRICS & GYNECOLOGY

## 2021-11-08 NOTE — PROGRESS NOTES
Pt here for pe/pap.   LMP:a few years ago   Last pap:6/25/19 negative   Last mammogram;8/8/19 negative   Birth control:N/A

## 2021-11-16 ENCOUNTER — HOSPITAL ENCOUNTER (OUTPATIENT)
Dept: MAMMOGRAPHY | Age: 56
Discharge: HOME | End: 2021-11-16
Attending: OBSTETRICS & GYNECOLOGY
Payer: COMMERCIAL

## 2021-11-16 DIAGNOSIS — Z12.31 BREAST CANCER SCREENING BY MAMMOGRAM: ICD-10-CM

## 2021-11-16 PROCEDURE — 77067 SCR MAMMO BI INCL CAD: CPT | Performed by: OBSTETRICS & GYNECOLOGY

## 2021-11-16 PROCEDURE — 77063 BREAST TOMOSYNTHESIS BI: CPT | Performed by: OBSTETRICS & GYNECOLOGY

## 2021-12-01 NOTE — PROGRESS NOTES
I spoke with pt and discussed options for additional imaging. Pt to view TeleDNAhart message and let me know if she wants either done and I will place the order. Verbalized understanding.

## 2022-03-11 ENCOUNTER — OFFICE VISIT (OUTPATIENT)
Dept: FAMILY MEDICINE CLINIC | Facility: CLINIC | Age: 57
End: 2022-03-11
Payer: COMMERCIAL

## 2022-03-11 VITALS
WEIGHT: 129.5 LBS | OXYGEN SATURATION: 99 % | HEART RATE: 69 BPM | RESPIRATION RATE: 16 BRPM | HEIGHT: 65 IN | BODY MASS INDEX: 21.58 KG/M2 | TEMPERATURE: 98 F | SYSTOLIC BLOOD PRESSURE: 108 MMHG | DIASTOLIC BLOOD PRESSURE: 62 MMHG

## 2022-03-11 DIAGNOSIS — M54.2 NECK PAIN: ICD-10-CM

## 2022-03-11 DIAGNOSIS — H53.10 EYE FATIGUE, BILATERAL: ICD-10-CM

## 2022-03-11 DIAGNOSIS — M62.838 TRAPEZIUS MUSCLE SPASM: ICD-10-CM

## 2022-03-11 DIAGNOSIS — R06.83 SNORING: ICD-10-CM

## 2022-03-11 DIAGNOSIS — Z00.00 ROUTINE GENERAL MEDICAL EXAMINATION AT A HEALTH CARE FACILITY: Primary | ICD-10-CM

## 2022-03-11 PROCEDURE — 3074F SYST BP LT 130 MM HG: CPT | Performed by: FAMILY MEDICINE

## 2022-03-11 PROCEDURE — 99213 OFFICE O/P EST LOW 20 MIN: CPT | Performed by: FAMILY MEDICINE

## 2022-03-11 PROCEDURE — 99396 PREV VISIT EST AGE 40-64: CPT | Performed by: FAMILY MEDICINE

## 2022-03-11 PROCEDURE — 3078F DIAST BP <80 MM HG: CPT | Performed by: FAMILY MEDICINE

## 2022-03-11 PROCEDURE — 3008F BODY MASS INDEX DOCD: CPT | Performed by: FAMILY MEDICINE

## 2022-03-14 ENCOUNTER — LAB ENCOUNTER (OUTPATIENT)
Dept: LAB | Age: 57
End: 2022-03-14
Attending: FAMILY MEDICINE
Payer: COMMERCIAL

## 2022-03-14 DIAGNOSIS — Z00.00 ROUTINE GENERAL MEDICAL EXAMINATION AT A HEALTH CARE FACILITY: ICD-10-CM

## 2022-03-14 LAB
ALBUMIN/GLOB SERPL: 1.2 {RATIO} (ref 1–2)
ALP LIVER SERPL-CCNC: 89 U/L
ALT SERPL-CCNC: 19 U/L
AST SERPL-CCNC: 17 U/L (ref 15–37)
BASOPHILS # BLD AUTO: 0.02 X10(3) UL (ref 0–0.2)
BASOPHILS NFR BLD AUTO: 0.6 %
BILIRUB SERPL-MCNC: 1.4 MG/DL (ref 0.1–2)
BUN BLD-MCNC: 19 MG/DL (ref 7–18)
CALCIUM BLD-MCNC: 9.6 MG/DL (ref 8.5–10.1)
CHLORIDE SERPL-SCNC: 110 MMOL/L (ref 98–112)
CHOLEST SERPL-MCNC: 185 MG/DL (ref ?–200)
CO2 SERPL-SCNC: 29 MMOL/L (ref 21–32)
CREAT BLD-MCNC: 0.79 MG/DL
EOSINOPHIL NFR BLD AUTO: 2 %
ERYTHROCYTE [DISTWIDTH] IN BLOOD BY AUTOMATED COUNT: 12.7 %
FASTING PATIENT LIPID ANSWER: YES
FASTING STATUS PATIENT QL REPORTED: YES
GLOBULIN PLAS-MCNC: 3.3 G/DL (ref 2.8–4.4)
GLUCOSE BLD-MCNC: 97 MG/DL (ref 70–99)
HCT VFR BLD AUTO: 42.7 %
HDLC SERPL-MCNC: 79 MG/DL (ref 40–59)
HGB BLD-MCNC: 14 G/DL
IMM GRANULOCYTES # BLD AUTO: 0 X10(3) UL (ref 0–1)
IMM GRANULOCYTES NFR BLD: 0 %
LDLC SERPL CALC-MCNC: 95 MG/DL (ref ?–100)
LYMPHOCYTES # BLD AUTO: 1.31 X10(3) UL (ref 1–4)
LYMPHOCYTES NFR BLD AUTO: 37 %
MCH RBC QN AUTO: 31.3 PG (ref 26–34)
MCHC RBC AUTO-ENTMCNC: 32.8 G/DL (ref 31–37)
MCV RBC AUTO: 95.5 FL
MONOCYTES # BLD AUTO: 0.43 X10(3) UL (ref 0.1–1)
MONOCYTES NFR BLD AUTO: 12.1 %
NEUTROPHILS # BLD AUTO: 1.71 X10 (3) UL (ref 1.5–7.7)
NEUTROPHILS # BLD AUTO: 1.71 X10(3) UL (ref 1.5–7.7)
NEUTROPHILS NFR BLD AUTO: 48.3 %
NONHDLC SERPL-MCNC: 106 MG/DL (ref ?–130)
OSMOLALITY SERPL CALC.SUM OF ELEC: 296 MOSM/KG (ref 275–295)
PLATELET # BLD AUTO: 177 10(3)UL (ref 150–450)
POTASSIUM SERPL-SCNC: 4.4 MMOL/L (ref 3.5–5.1)
PROT SERPL-MCNC: 7.4 G/DL (ref 6.4–8.2)
RBC # BLD AUTO: 4.47 X10(6)UL
SODIUM SERPL-SCNC: 142 MMOL/L (ref 136–145)
TRIGL SERPL-MCNC: 59 MG/DL (ref 30–149)
TSI SER-ACNC: 1.24 MIU/ML (ref 0.36–3.74)
VLDLC SERPL CALC-MCNC: 10 MG/DL (ref 0–30)
WBC # BLD AUTO: 3.5 X10(3) UL (ref 4–11)

## 2022-03-14 PROCEDURE — 80061 LIPID PANEL: CPT

## 2022-03-14 PROCEDURE — 36415 COLL VENOUS BLD VENIPUNCTURE: CPT

## 2022-03-14 PROCEDURE — 84443 ASSAY THYROID STIM HORMONE: CPT

## 2022-03-14 PROCEDURE — 85025 COMPLETE CBC W/AUTO DIFF WBC: CPT

## 2022-03-14 PROCEDURE — 80053 COMPREHEN METABOLIC PANEL: CPT

## 2022-07-26 ENCOUNTER — TELEPHONE (OUTPATIENT)
Dept: FAMILY MEDICINE CLINIC | Facility: CLINIC | Age: 57
End: 2022-07-26

## 2022-07-26 NOTE — TELEPHONE ENCOUNTER
Patient called complaining of URI symptoms since last week, states started with severe sore that for a couple of days and then had nasal congestion, now has cough productive of yellowish sputum, no fever but feels fatigued, wants paxlovid. States did home covid tests and they were negative, did a test at [facility] on Sunday and got her results today was negative, not sure if it was PCR. Advised patient to late for Paxlovid, ordered PCR and advised if test done at [place] was not a PCR to call and schedule her test at [facility]. Advised to increase fluid intake and rest, isolate at home. Please schedule a video visit with me tomorrow at 12:40 tomorrow.

## 2022-07-27 ENCOUNTER — TELEMEDICINE (OUTPATIENT)
Dept: FAMILY MEDICINE CLINIC | Facility: CLINIC | Age: 57
End: 2022-07-27
Payer: COMMERCIAL

## 2022-07-27 DIAGNOSIS — J01.10 ACUTE NON-RECURRENT FRONTAL SINUSITIS: Primary | ICD-10-CM

## 2022-07-27 DIAGNOSIS — J06.9 URI WITH COUGH AND CONGESTION: ICD-10-CM

## 2022-07-27 RX ORDER — AZITHROMYCIN 250 MG/1
TABLET, FILM COATED ORAL
Qty: 6 TABLET | Refills: 0 | Status: SHIPPED | OUTPATIENT
Start: 2022-07-27 | End: 2022-08-01

## 2022-07-27 NOTE — TELEPHONE ENCOUNTER
Pt scheduled for video visit as requested  Future Appointments   Date Time Provider Benja Lindy   7/27/2022 12:40 PM Cuba Gonzalez MD [facility]

## 2023-03-07 ENCOUNTER — TELEPHONE (OUTPATIENT)
Dept: FAMILY MEDICINE CLINIC | Facility: CLINIC | Age: 58
End: 2023-03-07

## 2023-03-07 NOTE — TELEPHONE ENCOUNTER
Pt calling stating she has been having lower back pain for over the last week or so. Was on vacation and said she did excercise more while gone, but said pain was there before. Wants to come in and be seen for appt, especially if she needs an xray.  out all week, can pt see another provider? Pt does not want to wait until next week.  Please advise

## 2023-03-07 NOTE — TELEPHONE ENCOUNTER
Called patient and reports recently she did more exercise. She normally have back pain, however this time it is little more and lasted longer. She started pain a week ago rating 9/10 and today rate at 3-4/10. She has been trying epsom salt and it is helping. She was advised to give it rest of the week and avoid exercise for the rest of the week until pain is resolved. She was also informed to use advil if needed for pain. She agreed with the plan. She has appointment with Dr. Vinicius Jasso.      Future Appointments   Date Time Provider Benja Israel   3/13/2023  9:20 AM Cecilia Reynoso MD [facility]

## 2023-03-13 NOTE — TELEPHONE ENCOUNTER
Spoke with patient, she said she is ok with 3/24 with Dr. Gee Overall. She also attached her insurance card via [address] [facility].

## 2023-03-13 NOTE — TELEPHONE ENCOUNTER
Pt had to be rescheduled for today's vist due to Dr Amy Haney out. Her phy was reschedule (May 2023). Put her on the schedule for her back issue 3/24/23. Pt not happy. Wants to be seen earlier.

## 2023-03-24 ENCOUNTER — OFFICE VISIT (OUTPATIENT)
Dept: FAMILY MEDICINE CLINIC | Facility: CLINIC | Age: 58
End: 2023-03-24
Payer: COMMERCIAL

## 2023-03-24 VITALS
WEIGHT: 131.25 LBS | TEMPERATURE: 97 F | SYSTOLIC BLOOD PRESSURE: 104 MMHG | HEART RATE: 74 BPM | RESPIRATION RATE: 16 BRPM | HEIGHT: 65.35 IN | BODY MASS INDEX: 21.6 KG/M2 | OXYGEN SATURATION: 99 % | DIASTOLIC BLOOD PRESSURE: 60 MMHG

## 2023-03-24 DIAGNOSIS — R00.2 PALPITATIONS: ICD-10-CM

## 2023-03-24 DIAGNOSIS — Z12.31 ENCOUNTER FOR SCREENING MAMMOGRAM FOR MALIGNANT NEOPLASM OF BREAST: ICD-10-CM

## 2023-03-24 DIAGNOSIS — K21.9 GASTROESOPHAGEAL REFLUX DISEASE WITHOUT ESOPHAGITIS: ICD-10-CM

## 2023-03-24 DIAGNOSIS — Z00.00 LABORATORY EXAMINATION ORDERED AS PART OF A ROUTINE GENERAL MEDICAL EXAMINATION: ICD-10-CM

## 2023-03-24 DIAGNOSIS — R05.8 DRY COUGH: ICD-10-CM

## 2023-03-24 DIAGNOSIS — M54.50 ACUTE BILATERAL LOW BACK PAIN WITHOUT SCIATICA: Primary | ICD-10-CM

## 2023-03-24 LAB
ATRIAL RATE: 61 BPM
P AXIS: -24 DEGREES
P-R INTERVAL: 128 MS
Q-T INTERVAL: 412 MS
QRS DURATION: 80 MS
QTC CALCULATION (BEZET): 414 MS
R AXIS: 86 DEGREES
T AXIS: 55 DEGREES
VENTRICULAR RATE: 61 BPM

## 2023-03-24 NOTE — PATIENT INSTRUCTIONS
Please take over the counter prevacid once daily for 2 weeks to see if it helps with your dry cough.

## 2023-03-30 ENCOUNTER — TELEPHONE (OUTPATIENT)
Dept: FAMILY MEDICINE CLINIC | Facility: CLINIC | Age: 58
End: 2023-03-30

## 2023-03-30 NOTE — TELEPHONE ENCOUNTER
Pt calling stating she was in last week for back pain, and to start PT. However pt has consult 4/3 and first PT is not until 4/10. Pt's back pain has worsened and wondering if she can go get an xray to see what is going on.  Please advise

## 2023-03-30 NOTE — TELEPHONE ENCOUNTER
Dr. Vinicius Jasso, please advise if Xray is recommended    Future Appointments   Date Time Provider Benja Israel   4/3/2023  3:45 PM Mahad Neil [facility]   4/10/2023 11:45 AM Janette Rosenthal, PT [facility]   4/12/2023 11:45 AM Janette Rosenthal, PT [facility]   4/17/2023  8:45 AM Gerardo Oswald, PT [facility]   4/19/2023 12:30 PM Janette Rosenthal, PT [facility]   4/24/2023  2:15 PM Mahad Neil [facility]   4/26/2023  3:00 PM Janette Rosenthal, PT [facility]   5/1/2023  2:15 PM Janette Rosenthal, PT [facility]

## 2023-03-31 ENCOUNTER — TELEPHONE (OUTPATIENT)
Dept: PHYSICAL THERAPY | Facility: HOSPITAL | Age: 58
End: 2023-03-31

## 2023-03-31 NOTE — TELEPHONE ENCOUNTER
Called patient and advised of recommendation per Dr. Chris Powell note. States she doesn't want to take any oral medication including ibuprofen until she knows the diagnosis causing her pain. She has been using heat and Bengay topically to her back. Advised over the weekend she alternate cold with heat to low back several times a day. Suggested she try topical Aspercreme in place of BenGay. PT treatments will start on Monday. Informed even though it's her initial evaluation they will still work with her back. Verbalizes understanding. Advised I will update Dr. Pablo Carty.     Dr. Casey Sensor

## 2023-03-31 NOTE — TELEPHONE ENCOUNTER
I would prefer that she do PT first, patient does not have any radiculopathy, I can send a prescription for anti inflammatory for her to try.

## 2023-04-03 ENCOUNTER — OFFICE VISIT (OUTPATIENT)
Dept: PHYSICAL THERAPY | Age: 58
End: 2023-04-03
Attending: FAMILY MEDICINE
Payer: COMMERCIAL

## 2023-04-03 DIAGNOSIS — M54.50 ACUTE BILATERAL LOW BACK PAIN WITHOUT SCIATICA: ICD-10-CM

## 2023-04-03 PROCEDURE — 97110 THERAPEUTIC EXERCISES: CPT

## 2023-04-03 PROCEDURE — 97161 PT EVAL LOW COMPLEX 20 MIN: CPT

## 2023-04-03 NOTE — PROGRESS NOTES
PHYSICAL THERAPY INITIAL EVALUATION     Date of service: 4/3/2023  Dx: Acute bilateral low back pain without sciatica (M54.50)       Insurance: [place] AETNA  Insurance Limits: auth required  Visit #: 1  Authorized # of Visits: 8  POC/Auth Expiration: 3/29/23  Authorizing Physician/Provider: Renetta     PATIENT SUMMARY     History/BRYSON: \"I have been having a back ache for so many years. The worst time started this year on Feb 1st. I was in the car and it's terrible. I just can't stay sitting in the car. It lasted for a few days. And then at the end of February, it happened again and it last the whole week. The only thing I can think of is that I lied down on a hard surface while I was in [place]. \"      It's so bad recently. Last week it got worse. Since Monday all the way through Friday, I had to lie down on the bed the whole day. I was trying to bend forward to stretch out my back and I couldn't stand up. I was only able to sit up for an at a time. Today is a little bit better. Sitting is worse than standing. \"    I had some classes, a dancing class, that I have been taking from January 1st. I was doing these stretches in class. She has been going once a week. The patient repots that she would like an x-ray to see what's going on in her back. She reports that previous methods used for pain management haven't worse recently. DOI/S: chronic onset, multiple flare-ups within the past few weeks     Aggravating Factors: sitting, sitting in the car, bending forward even to brush teeth or toilet    Alleviating Factors: standing, lying down, manual support to the low back     PMH: The patient's PMH was reviewed with the patient including allergies, medications, and surgical and medical history. Patient  has a past medical history of Ganglion of joint, History of pregnancy, Other decreased white blood cell count, Pain in joint, hand, Pain in joint, lower leg, and Pain in or around eye.  The patient reports a chronic back ache but she was able to manage with home remedies including heat and epsom salts. The patient denies any other relevant PMH. PLF/Personal Goals: \"I like dancing and I would like to go back to my dancing class. \" The patient reports that she likes to go hiking. 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 OBJECTIVE:     Pain/Symptom Presentation:   Pain at rest: 7/10  Pain at worst: 9/10    Outcomes Measure(s):   Modified Oswestry: 36% disability    Activity Measures:  Sleeping: Mild Activity Limitation: Patient is able to sleep as long as she has support for her back. Sitting: Severe Activity Limitation: Patient is able to sit about 10 minutes. Deskwork/Computer Work: Moderate Activity Limitation: Patient is only able to use standing desk for work. Bending Forward: Moderate Activity Limitation: Patient is with pain and moderate mobility limitations for forward bending. Standing/Walking After Prolonged Sitting: Moderate Activity Limitation: Patient is with constant stiffness. Standing: Mild Activity Limitation: Patient is able to stand up to 4 hours. Walking: Mild Activity Limitation: Patient is able to walk for an hour with mild back pain. Driving: Moderate Activity Limitation: Patient is able to drive about 30 minutes. Pushing: Extreme Activity Limitation: Patient is avoid lifting/pushing/pulling activities. Pulling: Extreme Activity Limitation: Patient is avoid lifting/pushing/pulling activities. Lifting Light Objects: Extreme Activity Limitation: Patient is avoid lifting/pushing/pulling activities. Inspection/Observation: Patient demonstrates no apparent bruising, swelling or deformity. Palpation: Patient demonstrates mild TTP over the lower lumbar spine segments. Patient denies any TTP over the sacral sulcus.    Sensation: Patient reports radicular symptoms into her right glutes and lateral hip. ROM:   Hip Motion PROM AROM    Right Left Right Left   Hip Flexion N/A N/A [facility]   Hip Extension N/A N/A N/A N/A   Hip ABD N/A N/A N/A N/A   Hip ER (at 90deg hip flex) 60 60 N/A N/A   Hip IR (at 90deg hip flex) 30 30 N/A N/A   *indicates activity was associated with pain    Special Tests:   Low Back Special Tests: Forward spinal flexion: (+)  Spinal extension: (-)  SLR: (R) (+), (L) (+)  Hip FADIR (hip scour): (R) (-), (L) (-)     SFMA Base Screen:   Multi-Segmental Flexion: DP  Multi-Segmental Extension: DN  Multi-Segmental Rotation: (R) DP, (L) DN     ASSESSMENT:     Edith Scheuermann is a 62year old female that presents to physical therapy evaluation for chronic low back pain with a recent flare-up that is more intense and is causing radicular symptoms that has not resolved with her typical home remedies. The patient reports recent multiple flare-ups, the most recent one left her mostly bed-ridden for a few days. The patient demonstrates an extension bias, preferring standing and walking over sitting, though when lying supine, she feels better in a hooklying position. She is with a (+) SLR test bilaterally. Hip mobility is pain-free and doesn't reproduce symptoms. She is with some TTP over the lower lumbar spinal segments and denies any TTP over the sacral sulcus. The patient localizes the primary of pain over the right low back and sacral area, travelling laterally into her right hip. She demonstrates signs and symptoms consistent with possible disc herniation. Current functional limitation include but are not limited to bending forward, prolonged sitting, sleeping, and pushing/pulling/lifting ADL's. The patient would benefit from physical therapy to facilitate improved pain and symptom management and progression in mobility, flexibility, strength, and stability for full return to ADL's, occupational activities and PLF including recreational dance classes for general fitness.      Precautions/WB Status: WBAT  Education or Treatment Limitation(s): None  Rehab Potential: Good    TREATMENT:     Initial Evaluation: x 20min     Therapeutic Exercise: x 15min  Prone press up with sacral stabilization: 1 x 5   Patient education: purpose and rationale for starting physical therapy prior to additional imaging   Patient education: differential diagnosis, classification for low back symptoms  Patient education: Patient was educated on anatomy and pathophysiology of current condition, rationale for physical therapy, anticipated treatment interventions, prognosis, timeline for recovery, and expected functional outcomes based on evaluative findings. Patient was educated on the importance of compliance with consistent treatment and HEP to achieve mutually established goals. Administered HEP: Issued and reviewed HEP handout, exercise selection, and recommended resistance. Provided verbal and written instructions/cueing for proper technique and common errors/compensations as needed. Manual Therapy: x 5min  Lumbar spine PA accessory joint mobs - L1-L5: Grade 3, 2 x 10\" each  Thoracic spine PA accessory joint mobs - T1-T12: Grade 3, 2 x 10\" each    Home Exercise Program: Patient was issued a HEP handout 4/3/2023 including prone press up. Provider Interactions With Patient:   Patient education was provided as described above. All patient's questions were answered and the patient denied further comments, complaints, or concerns upon departure. Patient was issued an appt list and verbally confirmed the next appt date and time to ensure consistency with physical therapy attendance. Charges: PT Eval Low Complexity Complexity x 1, Therex x 2   Total Timed Treatment: 20min       Total Treatment Time: 40min     PLAN OF CARE:      Goals:  Short-Term Goals:  1. Patient will improve low back and hip mobility to allow for intermittent pain-free forward bending to reach for and  objects up from the floor.  Timeframe: 3 weeks.  2. Patient will improve low back and hip mobility to facilitate erect standing and walking after prolonged sitting. Timeframe: 3 weeks. Long-Term Goals:  1. Patient will improve low back mobility and postural endurance to maintain proper posture with neutral lumbar spine and pelvic position while sitting to facilitate 4 hours of pain-free sitting and desk work for occupational activities. Timeframe: 4-6 weeks. 2. Patient will improve Modified Oswestry Low Back Pain Disability Questionnaire score by 10 points or 10% to indicate a true change in improved function for ADL's and restoring PLF. Timeframe: 4-6 weeks. 3. Patient will demonstrates safe and proper lifting mechanics with intermittent lifting objects of 20lbs from a lower height to facilitate housework and lifting ADL's. Timeframe: 4-6 weeks. 4. Patient will improve low back pain and core strength and stability to facilitate pain-free return to dance for general fitness activities. Timeframe: 6 weeks. Plan Frequency / Duration: Patient will be seen for 2x/week for 4 weeks, for a total of 8 visits, over a 90 day period. We will re-evaluate the patient at that time in order to determine functional progress, evaluate short-term goal completion, and establish an updated plan of care. Possible treatment interventions will/may include: Therapeutic Activities, Therapeutic Exercise, Neuromuscular Re-education, Manual Therapy, Home Exercise Program Instruction, Patient Education, Self-Care/Home Management, and Modalities as needed. Patient/Family was advised of these findings, precautions, and treatment options and has agreed to actively participate in planning and for this course of care. Thank you for your referral. Please co-sign or sign and return this letter via fax as soon as possible to [phone]. If you have any questions, please contact me at Dept: [phone].     Sincerely,    X___Matilde Thomason Mac, PT, DPT, SCS, ATC, CSCS____ Date: _____4/3/2023________    Electronically signed by therapist: Kam Martines, PT  [de-identified] certification required: Yes  I certify the need for these services furnished under this plan of treatment and while under my care.

## 2023-04-10 ENCOUNTER — OFFICE VISIT (OUTPATIENT)
Dept: PHYSICAL THERAPY | Age: 58
End: 2023-04-10
Attending: FAMILY MEDICINE
Payer: COMMERCIAL

## 2023-04-10 PROCEDURE — 97110 THERAPEUTIC EXERCISES: CPT

## 2023-04-10 PROCEDURE — 97140 MANUAL THERAPY 1/> REGIONS: CPT

## 2023-04-10 NOTE — PATIENT INSTRUCTIONS
Thank you for coming to your physical therapy appt! During your appt today you were issued a HEP handout from [email or web address] which can also be accessed using the information below. The exercises chosen are meant to supplement the treatment you received and reinforce the progress made in physical therapy. It is important to stay consistent with your exercises to help facilitate and maximize your recovery! View at [email or web address] using code: [email or web address]

## 2023-04-10 NOTE — PROGRESS NOTES
Date of Service: 4/10/2023  Dx: Acute bilateral low back pain without sciatica (M54.50)            Ins[place] [facility] [place]  Insurance Limits: auth required  Visit #: 2  Authorized # of Visits: 8  POC/Auth Expiration: 3/29/23  Date of Last PN: 4/3/23 (Visit #1)  Authorizing Physician/Provider: Renetta Marcos MD visit: N/A  Fall Risk: Standard          Precautions: None            Subjective: \"There's not much difference. I still feels some pain in her back and numbness still. \" She reports compliance with her HEP and she does feel like that is helping. \"I still feel pain when I sit down, like if I'm driving. This week, the right side felt numb. After I walk it feel better. Pain/Symptom Presentation:   Pain at rest: 5/10  Pain at worst: 6/10    Objective:     Multi-Segmental Extension SFMA Breakout  Prone Press Up: DP  Active GERARDO Ext/Rot (30deg): (R) DN, (L) DN - tightness  Passive GERARDO Ext/Rot (30deg): (R) DN, (L) DN  - tightness  ARMANDO: (R) DN, (L) DN  Active Prone Hip Ext (10deg): (R) FP, (L) FN  Passive Prone Hip Ext (10deg): (R) FN, (L) FN    Multi-Segmental Rotation SFMA Breakout:  Active Seated Hip ER (40deg): (R) FN, (L) FN  Passive Seated Hip ER (40deg): (R) FN, (L) FN  Active Seated Hip IR (30deg): (R) FN, (L) FN  Passive Seated Hip IR (30deg): (R) FN, (L) FN  Active Prone Hip ER (40deg): (R) FN, (L) FN  Passive Prone Hip ER (40deg): (R) FN, (L) FN  Active Prone Hip IR (30deg): (R) FN, (L) FN  Passive Prone Hip IR (30deg): (R) FN, (L) FN    Treatment:    Therapeutic Exercise: x 20min  Manual prone quad stretch: x 30\" (B)  Prone press up: 1 x 10   S/L thoracic rotation: 1 x 10 (B)   Cat/camel: x 10   Modified downward dog: 1 x 10 x 3\"   HEP update: Reviewed new HEP handout with new exercises and progressions, provided verbal and written instructions/cueing for proper technique and common errors/compensations as needed.  Reviewed the importance of compliance with home exercise program to facilitate recovery and meet long-term goals. Neuromuscular Re-education: x 5min  Hooklying PPT: x 20   90-90 heel taps: 2 x 5 (B)     Manual Therapy: x 15min  Lumbar spine PA accessory joint mobs - L1-L5: Grade 3, 2 x 10\" each  Thoracic spine PA accessory joint mobs - T1-T12: Grade 3, 2 x 10\" each}  Soft tissue mobilization: (R) glutes, low back     Provider Interactions With Patient:   Added therapeutic exercises as documented, with cueing provided throughout performance to ensure correct technique during exercise. Provided patient with a written copy of exercise instruction which was also documented in patient's electronic medical chart. Assessment: Mendez Carter reports compliance with her HEP but is still with complaints of LBP and right radicular symptoms. The patient demonstrates good hip mobility and flexibility, but does have some stiffness in her thoracic spine. We progressed exercises to include thoracic spine mobility and core stabilization strengthening. The patient was issued an updated HEP handout to reflect recent progressions in her exercise regimen. We will continue to progress the patient as tolerated. Goals:   Short-Term Goals:  1. Patient will improve low back and hip mobility to allow for intermittent pain-free forward bending to reach for and  objects up from the floor. Timeframe: 3 weeks. 2. Patient will improve low back and hip mobility to facilitate erect standing and walking after prolonged sitting. Timeframe: 3 weeks. Long-Term Goals:  1. Patient will improve low back mobility and postural endurance to maintain proper posture with neutral lumbar spine and pelvic position while sitting to facilitate 4 hours of pain-free sitting and desk work for occupational activities. Timeframe: 4-6 weeks. 2. Patient will improve Modified Oswestry Low Back Pain Disability Questionnaire score by 10 points or 10% to indicate a true change in improved function for ADL's and restoring PLF.  Timeframe: 4-6 weeks.  3. Patient will demonstrates safe and proper lifting mechanics with intermittent lifting objects of 20lbs from a lower height to facilitate housework and lifting ADL's. Timeframe: 4-6 weeks. 4. Patient will improve low back pain and core strength and stability to facilitate pain-free return to dance for general fitness activities. Timeframe: 6 weeks. Plan: Follow up on tolerance to updated HEP. HEP: Patient was issued an updated HEP handout 4/10/23 including S/L thoracic rotation, modified downward dog, cat/camel stretch, PPT, and 90-90 heel taps, to be added to HEP issued 4/3/2023 including prone press up. Charges:  Therex x 2, MT x 1          Total Timed Treatment: 40min    Total Treatment Time: 40min

## 2023-04-12 ENCOUNTER — OFFICE VISIT (OUTPATIENT)
Dept: PHYSICAL THERAPY | Age: 58
End: 2023-04-12
Attending: FAMILY MEDICINE
Payer: COMMERCIAL

## 2023-04-12 PROCEDURE — 97110 THERAPEUTIC EXERCISES: CPT

## 2023-04-12 PROCEDURE — 97140 MANUAL THERAPY 1/> REGIONS: CPT

## 2023-04-12 NOTE — PATIENT INSTRUCTIONS
Thank you for coming to your physical therapy appt! During your appt today you were issued a HEP handout from [email or web address] which can also be accessed using the information below. The exercises chosen are meant to supplement the treatment you received and reinforce the progress made in physical therapy. It is important to stay consistent with your exercises to help facilitate and maximize your recovery! View at [email or web address] [email or web address] using code: [email or web address]

## 2023-04-12 NOTE — PROGRESS NOTES
Date of Service: 4/12/2023  Dx: Acute bilateral low back pain without sciatica (M54.50)            Insurance: [place] AETNA  Insurance Limits: auth required  Visit #: 3  Authorized # of Visits: 8  POC/Auth Expiration: 3/29/23  Date of Last PN: 4/3/23 (Visit #1)  Authorizing Physician/Provider: Renetta Marcos MD visit: N/A  Fall Risk: Standard          Precautions: None            Subjective: \"It's very tight. I think it feels a little bit better actually. I did some exercise yesterday and this morning, it was a little bit better. \"     Pain/Symptom Presentation:   Pain at rest: 4/10  Pain at worst: 5/10    Objective:   Palpation: Patient was TTP over the lower lumbar spine segments this date. She was also with active trigger point in her right glute musculature. Treatment:    Therapeutic Exercise: x 20min  Manual prone quad stretch: x 30\" (B)  Cat/camel: x 10   Supine figure-4 piriformis stretch: attempted but d/c due to LBP  Supine hip ER stretch: x 30\" (B)   S/L thoracic rotation: 1 x 10 (B)   S/L clamshell: 2 x 10 (B), red theraband   Seated low back/lumbar flex stretch: attempted but d/c due to pain   Modified downward dog: 1 x 10 x 3\"   HEP update: Reviewed new HEP handout with new exercises and progressions, provided verbal and written instructions/cueing for proper technique and common errors/compensations as needed. Reviewed the importance of compliance with home exercise program to facilitate recovery and meet long-term goals.      Neuromuscular Re-education: x 5min  Trigger point release: (L) glute max, glute med   Hooklying PPT: x 20   90-90 heel taps: 2 x 5 (B)     Manual Therapy: x 15min  Lumbar spine PA accessory joint mobs - L1-L5: Grade 2, 2 x 10\" each - mild TTP this date   Thoracic spine PA accessory joint mobs - T1-T12: Grade 3, 2 x 10\" each}  Soft tissue mobilization: (R) glutes, low back     Provider Interactions With Patient:   Added therapeutic exercises as documented, with cueing provided throughout performance to ensure correct technique during exercise. Provided patient with a written copy of exercise instruction which was also documented in patient's electronic medical chart. Assessment: Anny Hinds is still with complaints of lower back pain and stiffness with pain in her right glute area. She was found to have active trigger points in the right glute musculature that we addressed during today's session. She is still with pain with lumbar flexion activities, with pain noted during figure-4 piriformis stretch and seated low back/lumbar flex stretch. The patient was given an updated HEP for additional glute strengthening and hip stretching. The patient would benefit from continued therapy for continued core and hip stabilization and strengthening for improved pain and symptom management. Goals:   Short-Term Goals:  1. Patient will improve low back and hip mobility to allow for intermittent pain-free forward bending to reach for and  objects up from the floor. Timeframe: 3 weeks. 2. Patient will improve low back and hip mobility to facilitate erect standing and walking after prolonged sitting. Timeframe: 3 weeks. Long-Term Goals:  1. Patient will improve low back mobility and postural endurance to maintain proper posture with neutral lumbar spine and pelvic position while sitting to facilitate 4 hours of pain-free sitting and desk work for occupational activities. Timeframe: 4-6 weeks. 2. Patient will improve Modified Oswestry Low Back Pain Disability Questionnaire score by 10 points or 10% to indicate a true change in improved function for ADL's and restoring PLF. Timeframe: 4-6 weeks. 3. Patient will demonstrates safe and proper lifting mechanics with intermittent lifting objects of 20lbs from a lower height to facilitate housework and lifting ADL's. Timeframe: 4-6 weeks.   4. Patient will improve low back pain and core strength and stability to facilitate pain-free return to dance for general fitness activities. Timeframe: 6 weeks. Plan: Follow up on tolerance to updated HEP. HEP: Patient was issued an updated HEP handout 4/12/23 including supine hip ER stretch and S/L clamshells, to be added to HEP issued 4/10/23 including S/L thoracic rotation, modified downward dog, cat/camel stretch, PPT, and 90-90 heel taps, to be added to HEP issued 4/3/2023 including prone press up. Charges:  Therex x 2, MT x 1          Total Timed Treatment: 40min    Total Treatment Time: 40min

## 2023-04-17 ENCOUNTER — APPOINTMENT (OUTPATIENT)
Dept: PHYSICAL THERAPY | Age: 58
End: 2023-04-17
Attending: FAMILY MEDICINE
Payer: COMMERCIAL

## 2023-04-17 ENCOUNTER — TELEPHONE (OUTPATIENT)
Dept: PHYSICAL THERAPY | Age: 58
End: 2023-04-17

## 2023-04-17 ENCOUNTER — OFFICE VISIT (OUTPATIENT)
Dept: PHYSICAL THERAPY | Age: 58
End: 2023-04-17
Attending: FAMILY MEDICINE
Payer: COMMERCIAL

## 2023-04-17 PROCEDURE — 97140 MANUAL THERAPY 1/> REGIONS: CPT

## 2023-04-17 PROCEDURE — 97110 THERAPEUTIC EXERCISES: CPT

## 2023-04-17 NOTE — PATIENT INSTRUCTIONS
Thank you for coming to your physical therapy appt! During your appt today you were issued a HEP handout from [email or web address] which can also be accessed using the information below. The exercises chosen are meant to supplement the treatment you received and reinforce the progress made in physical therapy. It is important to stay consistent with your exercises to help facilitate and maximize your recovery! View at [email or web address] using code: [email or web address]

## 2023-04-17 NOTE — PROGRESS NOTES
Date of Service: 4/17/2023  Dx: Acute bilateral low back pain without sciatica (M54.50)            Insurance: O AETNA  Insurance Limits: auth required  Visit #: 4  Authorized # of Visits: 8  POC/Auth Expiration: 3/29/23  Date of Last PN: 4/3/23 (Visit #1)  Authorizing Physician/Provider: Renetta Marcos MD visit: N/A  Fall Risk: Standard          Precautions: None            Subjective: \"It feels a little bit better. It's still sore in the glute area. \" The patient reports that she is doing her HEP twice a day. \"After I do them, I feel more relaxed. \" The patient reports that she feels the pain when she bends forward. The patient reports that she was doing an exercise in her dance class that involved kicking her leg out to the side and she felt pain on the outside of her left hip and it's been there for about a month. Pain/Symptom Presentation:   Pain at rest: 4/10  Pain at worst: 5/10    Objective:   Palpation:     Treatment:    Therapeutic Exercise: x 20min  Manual prone quad stretch: x 30\" (B)  Cat/camel: x 10   Child's pose: attempted but d/c due to LBP  Supine figure-4 piriformis stretch - modified range: x 30\" (B)   S/L thoracic rotation: 1 x 10 (B)   Modified downward dog: 1 x 10 x 3\"   Patient education: self-massage with foam roll to glutes, hamstrings, ITB/VL, paraspinals, and spinal ext mobilizations  HEP update: Reviewed new HEP handout with new exercises and progressions, provided verbal and written instructions/cueing for proper technique and common errors/compensations as needed. Reviewed the importance of compliance with home exercise program to facilitate recovery and meet long-term goals.      Neuromuscular Re-education: x 5min  Trigger point release: (L) glute max, glute med     Manual Therapy: x 15min  Lumbar spine PA accessory joint mobs - L1-L5: Grade 2, 2 x 10\" each - mild TTP this date   Thoracic spine PA accessory joint mobs - T1-T12: Grade 3, 2 x 10\" each}  Soft tissue mobilization: (R) glutes, low back     Provider Interactions With Patient:   Added therapeutic exercises as documented, with cueing provided throughout performance to ensure correct technique during exercise. Provided patient with a written copy of exercise instruction which was also documented in patient's electronic medical chart. Assessment: Viki Rollins is still with complaints of lower back pain and stiffness with pain in her right glute area that is exacerbated with lumbar flexion. The patient was educated on foam roll massage for the glutes, ITB/VL, hamstring, and paraspinals as well as spinal extension stretching for home. Gave patient information to facilitate purchasing the correct item on her own. The patient would benefit from continued therapy for further progression in pain and symptom management and further progression in core and hip ABD strengthening and stabilization. Goals:   Short-Term Goals:  1. Patient will improve low back and hip mobility to allow for intermittent pain-free forward bending to reach for and  objects up from the floor. Timeframe: 3 weeks. 2. Patient will improve low back and hip mobility to facilitate erect standing and walking after prolonged sitting. Timeframe: 3 weeks. Long-Term Goals:  1. Patient will improve low back mobility and postural endurance to maintain proper posture with neutral lumbar spine and pelvic position while sitting to facilitate 4 hours of pain-free sitting and desk work for occupational activities. Timeframe: 4-6 weeks. 2. Patient will improve Modified Oswestry Low Back Pain Disability Questionnaire score by 10 points or 10% to indicate a true change in improved function for ADL's and restoring PLF. Timeframe: 4-6 weeks. 3. Patient will demonstrates safe and proper lifting mechanics with intermittent lifting objects of 20lbs from a lower height to facilitate housework and lifting ADL's. Timeframe: 4-6 weeks.   4. Patient will improve low back pain and core strength and stability to facilitate pain-free return to dance for general fitness activities. Timeframe: 6 weeks. Plan: Follow up on tolerance to updated HEP. HEP: Patient was issued an updated HEP handout 4/17/23 including foam roll massage for hips, back, and spinal extension mobilizations, to be added to HEP issued 4/12/23 including supine hip ER stretch and S/L clamshells, to be added to HEP issued 4/10/23 including S/L thoracic rotation, modified downward dog, cat/camel stretch, PPT, and 90-90 heel taps, to be added to HEP issued 4/3/2023 including prone press up. Charges:  Therex x 2, MT x 1          Total Timed Treatment: 40min    Total Treatment Time: 40min

## 2023-04-19 ENCOUNTER — TELEPHONE (OUTPATIENT)
Dept: PHYSICAL THERAPY | Age: 58
End: 2023-04-19

## 2023-04-19 ENCOUNTER — OFFICE VISIT (OUTPATIENT)
Dept: PHYSICAL THERAPY | Age: 58
End: 2023-04-19
Attending: FAMILY MEDICINE
Payer: COMMERCIAL

## 2023-04-19 PROCEDURE — 97110 THERAPEUTIC EXERCISES: CPT

## 2023-04-19 PROCEDURE — 97140 MANUAL THERAPY 1/> REGIONS: CPT

## 2023-04-19 NOTE — PROGRESS NOTES
Date of Service: 4/19/2023  Dx: Acute bilateral low back pain without sciatica (M54.50)            Insurance: O AETNA  Insurance Limits: auth required  Visit #: 5   Authorized # of Visits: 8   POC/Auth Expiration: 3/29/23  Date of Last PN: 4/3/23 (Visit #1)  Authorizing Physician/Provider: Renetta Marcos MD visit: N/A  Fall Risk: Standard          Precautions: None            Subjective: The patient arrived 15 minutes late for her appt. She reports that she purchased the foam roll and that it should come today. The patient reports that her back is feeling better but she is still with pain with forward bending. Pain/Symptom Presentation:   Pain at rest: 3/10  Pain at worst: 5/10    Objective:   Palpation:     Treatment:    Therapeutic Exercise: x 10min  Prone press up: 1 x 10   Cat/camel: x 10   S/L hip ABD: 2 x 10 (B)   Half-kneeling thoracic rotation: 1 x 10 (B)   HEP update: Reviewed new HEP handout with new exercises and progressions, provided verbal and written instructions/cueing for proper technique and common errors/compensations as needed. Reviewed the importance of compliance with home exercise program to facilitate recovery and meet long-term goals. Neuromuscular Re-education: x 5min  Trigger point release: (L) glute max, glute med     Manual Therapy: x 15min  Lumbar spine PA accessory joint mobs - L1-L5: Grade 2, 2 x 10\" each - mild TTP this date   Thoracic spine PA accessory joint mobs - T1-T12: Grade 3, 2 x 10\" each}  Soft tissue mobilization: (R) glutes, low back     Provider Interactions With Patient:   Added therapeutic exercises as documented, with cueing provided throughout performance to ensure correct technique during exercise. Provided patient with a written copy of exercise instruction which was also documented in patient's electronic medical chart.     Assessment: Shabbir Sandoval reports improved mobility for prone press up this date but is still with pain with flexion-based activities including forward bending and sitting. The patient's exercise program was condensed this session. She was issued a HEP for exercises but was instructed to continue with foam rolling on her own at home. We will continue to progress patient as tolerated. Goals:   Short-Term Goals:  1. Patient will improve low back and hip mobility to allow for intermittent pain-free forward bending to reach for and  objects up from the floor. Timeframe: 3 weeks. 2. Patient will improve low back and hip mobility to facilitate erect standing and walking after prolonged sitting. Timeframe: 3 weeks. Long-Term Goals:  1. Patient will improve low back mobility and postural endurance to maintain proper posture with neutral lumbar spine and pelvic position while sitting to facilitate 4 hours of pain-free sitting and desk work for occupational activities. Timeframe: 4-6 weeks. 2. Patient will improve Modified Oswestry Low Back Pain Disability Questionnaire score by 10 points or 10% to indicate a true change in improved function for ADL's and restoring PLF. Timeframe: 4-6 weeks. 3. Patient will demonstrates safe and proper lifting mechanics with intermittent lifting objects of 20lbs from a lower height to facilitate housework and lifting ADL's. Timeframe: 4-6 weeks. 4. Patient will improve low back pain and core strength and stability to facilitate pain-free return to dance for general fitness activities. Timeframe: 6 weeks. Plan: Follow up on tolerance to updated HEP. HEP: Patient was issued an updated HEP handout 4/19/23 including prone press up, cat/camel, S/L hip ABD, half-kneeling rotation, downward dog, and 90-90 heel taps, to be added to HEP issued 4/17/23 including foam roll massage for hips, back, and spinal extension mobilizations. Charges:  Therex x 1, MT x 1          Total Timed Treatment: 40min    Total Treatment Time: 40min

## 2023-04-19 NOTE — PATIENT INSTRUCTIONS
Thank you for coming to your physical therapy appt! During your appt today you were issued a HEP handout from [employer] which can also be accessed using the information below. The exercises chosen are meant to supplement the treatment you received and reinforce the progress made in physical therapy. It is important to stay consistent with your exercises to help facilitate and maximize your recovery! View at [email or web address] using code: Galindo Vidales

## 2023-04-22 ENCOUNTER — HOSPITAL ENCOUNTER (OUTPATIENT)
Dept: MAMMOGRAPHY | Age: 58
Discharge: HOME | End: 2023-04-22
Attending: FAMILY MEDICINE
Payer: COMMERCIAL

## 2023-04-22 ENCOUNTER — LAB ENCOUNTER (OUTPATIENT)
Dept: LAB | Age: 58
End: 2023-04-22
Attending: FAMILY MEDICINE
Payer: COMMERCIAL

## 2023-04-22 DIAGNOSIS — Z12.31 ENCOUNTER FOR SCREENING MAMMOGRAM FOR MALIGNANT NEOPLASM OF BREAST: ICD-10-CM

## 2023-04-22 LAB
ALBUMIN SERPL-MCNC: 4 G/DL (ref 3.4–5)
ALBUMIN/GLOB SERPL: 1.1 {RATIO} (ref 1–2)
ALP LIVER SERPL-CCNC: 75 U/L
ALT SERPL-CCNC: 24 U/L
ANION GAP SERPL CALC-SCNC: 6 MMOL/L (ref 0–18)
AST SERPL-CCNC: 20 U/L (ref 15–37)
BASOPHILS # BLD AUTO: 0.03 X10(3) UL (ref 0–0.2)
BASOPHILS NFR BLD AUTO: 0.9 %
BILIRUB SERPL-MCNC: 1.4 MG/DL (ref 0.1–2)
BUN BLD-MCNC: 19 MG/DL (ref 7–18)
CALCIUM BLD-MCNC: 9.4 MG/DL (ref 8.5–10.1)
CHLORIDE SERPL-SCNC: 107 MMOL/L (ref 98–112)
CHOLEST SERPL-MCNC: 171 MG/DL (ref ?–200)
CO2 SERPL-SCNC: 29 MMOL/L (ref 21–32)
CREAT BLD-MCNC: 0.86 MG/DL
EOSINOPHIL # BLD AUTO: 0.06 X10(3) UL (ref 0–0.7)
EOSINOPHIL NFR BLD AUTO: 1.8 %
ERYTHROCYTE [DISTWIDTH] IN BLOOD BY AUTOMATED COUNT: 11.9 %
GFR SERPLBLD BASED ON 1.73 SQ M-ARVRAT: 79 ML/MIN/1.73M2 (ref 60–?)
GLOBULIN PLAS-MCNC: 3.5 G/DL (ref 2.8–4.4)
GLUCOSE BLD-MCNC: 90 MG/DL (ref 70–99)
HCT VFR BLD AUTO: 43.3 %
HDLC SERPL-MCNC: 68 MG/DL (ref 40–59)
HGB BLD-MCNC: 14.2 G/DL
IMM GRANULOCYTES # BLD AUTO: 0.01 X10(3) UL (ref 0–1)
IMM GRANULOCYTES NFR BLD: 0.3 %
LDLC SERPL CALC-MCNC: 87 MG/DL (ref ?–100)
LYMPHOCYTES # BLD AUTO: 1.45 X10(3) UL (ref 1–4)
LYMPHOCYTES NFR BLD AUTO: 44.6 %
MCH RBC QN AUTO: 30.7 PG (ref 26–34)
MCHC RBC AUTO-ENTMCNC: 32.8 G/DL (ref 31–37)
MCV RBC AUTO: 93.7 FL
MONOCYTES # BLD AUTO: 0.32 X10(3) UL (ref 0.1–1)
MONOCYTES NFR BLD AUTO: 9.8 %
NEUTROPHILS # BLD AUTO: 1.38 X10 (3) UL (ref 1.5–7.7)
NEUTROPHILS # BLD AUTO: 1.38 X10(3) UL (ref 1.5–7.7)
NEUTROPHILS NFR BLD AUTO: 42.6 %
NONHDLC SERPL-MCNC: 103 MG/DL (ref ?–130)
OSMOLALITY SERPL CALC.SUM OF ELEC: 296 MOSM/KG (ref 275–295)
PLATELET # BLD AUTO: 182 10(3)UL (ref 150–450)
POTASSIUM SERPL-SCNC: 3.8 MMOL/L (ref 3.5–5.1)
PROT SERPL-MCNC: 7.5 G/DL (ref 6.4–8.2)
RBC # BLD AUTO: 4.62 X10(6)UL
SODIUM SERPL-SCNC: 142 MMOL/L (ref 136–145)
TRIGL SERPL-MCNC: 85 MG/DL (ref 30–149)
TSI SER-ACNC: 1.11 MIU/ML (ref 0.36–3.74)
VLDLC SERPL CALC-MCNC: 14 MG/DL (ref 0–30)
WBC # BLD AUTO: 3.3 X10(3) UL (ref 4–11)

## 2023-04-22 PROCEDURE — 84443 ASSAY THYROID STIM HORMONE: CPT | Performed by: FAMILY MEDICINE

## 2023-04-22 PROCEDURE — 36415 COLL VENOUS BLD VENIPUNCTURE: CPT | Performed by: FAMILY MEDICINE

## 2023-04-22 PROCEDURE — 80053 COMPREHEN METABOLIC PANEL: CPT | Performed by: FAMILY MEDICINE

## 2023-04-22 PROCEDURE — 77067 SCR MAMMO BI INCL CAD: CPT | Performed by: FAMILY MEDICINE

## 2023-04-22 PROCEDURE — 85025 COMPLETE CBC W/AUTO DIFF WBC: CPT | Performed by: FAMILY MEDICINE

## 2023-04-22 PROCEDURE — 80061 LIPID PANEL: CPT | Performed by: FAMILY MEDICINE

## 2023-04-22 PROCEDURE — 77063 BREAST TOMOSYNTHESIS BI: CPT | Performed by: FAMILY MEDICINE

## 2023-04-24 ENCOUNTER — OFFICE VISIT (OUTPATIENT)
Dept: PHYSICAL THERAPY | Age: 58
End: 2023-04-24
Attending: FAMILY MEDICINE
Payer: COMMERCIAL

## 2023-04-24 PROCEDURE — 97140 MANUAL THERAPY 1/> REGIONS: CPT

## 2023-04-24 PROCEDURE — 97110 THERAPEUTIC EXERCISES: CPT

## 2023-04-24 PROCEDURE — 97112 NEUROMUSCULAR REEDUCATION: CPT

## 2023-04-24 NOTE — PROGRESS NOTES
Date of Service: 4/24/2023  Dx: Acute bilateral low back pain without sciatica (M54.50)            Insurance: O AETNA  Insurance Limits: auth required  Visit #: 6  Authorized # of Visits: 8   POC/Auth Expiration: 3/29/23  Date of Last PN: 4/3/23 (Visit #1)  Authorizing Physician/Provider: Renetta Marcos MD visit: N/A  Fall Risk: Standard          Precautions: None            Subjective: The patient reports that she is still with back pain. She got the foam roller in and she has been using it. She reports compliance with her HEP. Pain/Symptom Presentation:   Pain at rest: 3/10  Pain at worst: 3/10    Objective:   SFMA Base Screen:   Multi-Segmental Flexion: DP  Multi-Segmental Extension: DN  Multi-Segmental Rotation: (R) DP, (L) DN    Multi-Segmental Flexion SFMA Breakout  Active SLR (70deg): (R) FP, (L) FP  Passive SLR (80deg): (R) FP, (L) FP  Supine Knee to Chest: FP  Prone Rocking: FP    Multi-Segmental Extension SFMA Breakout  Prone Press Up: DN  Active GERRADO Ext/Rot (30deg): (R) FP, (L) FP  Passive GERARDO Ext/Rot (30deg): (R) FP, (L) FP  ARMANDO: (R) FN, (L) FN  Active Prone Hip Ext (10deg): (R) FP, (L) FP  Passive Prone Hip Ext (10deg): (R) FP, (L) FP    Multi-Segmental Rotation SFMA Breakout:  Active Seated Hip ER (40deg): (R) FN, (L) FN  Passive Seated Hip ER (40deg): (R) FN, (L) FN  Active Seated Hip IR (30deg): (R) FN, (L) FN  Passive Seated Hip IR (30deg): (R) FN, (L) FN  Active Prone Hip ER (40deg): (R) FN, (L) FN  Passive Prone Hip ER (40deg): (R) FN, (L) FN  Active Prone Hip IR (30deg): (R) FN, (L) FN  Passive Prone Hip IR (30deg): (R) FN, (L) FN    Treatment:    Therapeutic Exercise: x 15min  Prone press up: 1 x 10   Cat/camel: x 10   S/L hip ABD: 2 x 10 (B)   HEP update: Reviewed new HEP handout with new exercises and progressions, provided verbal and written instructions/cueing for proper technique and common errors/compensations as needed.  Reviewed the importance of compliance with home exercise program to facilitate recovery and meet long-term goals. Neuromuscular Re-education: x 15min  Trigger point release: (L) glute max, glute med   Jude bear crawl hold: 2 x 30\"   90-90 heel taps: 2 x 5 (B) with m/c and v/c for core stabilization/PPT  Seated boat pose: 1 x 10     Manual Therapy: x 15min  Lumbar spine PA accessory joint mobs - L1-L5: Grade 3, 2 x 10\" each   Thoracic spine PA accessory joint mobs - T1-T12: Grade 3, 2 x 10\" each}  Soft tissue mobilization: (R) glutes, low back     Provider Interactions With Patient:   Verbal and manual cueing on proper performance of the prescribed exercises. Provided patient with a written copy of exercise instruction which was also documented in patient's electronic medical chart. Assessment: David Polanco demonstrates overall improved and normalized hip and spinal mobility and flexibility, though she is still with pain with spinal and hip flexion-based movements. The patient was taken through additional core stabilization exercises and was issued an updated HEP handout. We will continue to progress core stabilization as tolerated. Goals:   Short-Term Goals:  1. Patient will improve low back and hip mobility to allow for intermittent pain-free forward bending to reach for and  objects up from the floor. Timeframe: 3 weeks. 2. Patient will improve low back and hip mobility to facilitate erect standing and walking after prolonged sitting. Timeframe: 3 weeks. Long-Term Goals:  1. Patient will improve low back mobility and postural endurance to maintain proper posture with neutral lumbar spine and pelvic position while sitting to facilitate 4 hours of pain-free sitting and desk work for occupational activities. Timeframe: 4-6 weeks. 2. Patient will improve Modified Oswestry Low Back Pain Disability Questionnaire score by 10 points or 10% to indicate a true change in improved function for ADL's and restoring PLF. Timeframe: 4-6 weeks.   3. Patient will demonstrates safe and proper lifting mechanics with intermittent lifting objects of 20lbs from a lower height to facilitate housework and lifting ADL's. Timeframe: 4-6 weeks. 4. Patient will improve low back pain and core strength and stability to facilitate pain-free return to dance for general fitness activities. Timeframe: 6 weeks. Plan: Continue to progress core stabilization strengthening. HEP: Patient was issued an updated HEP handout 4/24/23 including cat/camel, S/L hip ABD, isome bear crawl hold, seated boat pose, and 90-90 heel taps, and pallof press, to be added to HEP issued 4/17/23 including foam roll massage for hips, back, and spinal extension mobilizations. Charges:  Therex x 1, MT x 1, NMR x 1        Total Timed Treatment: 45min    Total Treatment Time: 45min

## 2023-04-24 NOTE — PATIENT INSTRUCTIONS
Thank you for coming to your physical therapy appt! During your appt today you were issued a HEP handout from [email or web address] which can also be accessed using the information below. The exercises chosen are meant to supplement the treatment you received and reinforce the progress made in physical therapy. It is important to stay consistent with your exercises to help facilitate and maximize your recovery! View at [email or web address]. [employer]ipewise using code: [id number]

## 2023-04-26 ENCOUNTER — OFFICE VISIT (OUTPATIENT)
Dept: PHYSICAL THERAPY | Age: 58
End: 2023-04-26
Attending: FAMILY MEDICINE
Payer: COMMERCIAL

## 2023-04-26 PROCEDURE — 97140 MANUAL THERAPY 1/> REGIONS: CPT

## 2023-04-26 PROCEDURE — 97112 NEUROMUSCULAR REEDUCATION: CPT

## 2023-04-26 PROCEDURE — 97110 THERAPEUTIC EXERCISES: CPT

## 2023-04-26 NOTE — PROGRESS NOTES
Date of Service: 4/26/2023  Dx: Acute bilateral low back pain without sciatica (M54.50)            Insurance: [facility] AETNA  Insurance Limits: auth required  Visit #: 7  Authorized # of Visits: 8   POC/Auth Expiration: 3/29/23  Date of Last PN: 4/3/23 (Visit #1)  Authorizing Physician/Provider: Renetta Marcos MD visit: N/A  Fall Risk: Standard          Precautions: None            Subjective: The patient reports that she is still with some back pain when she bends forward.      Pain/Symptom Presentation:   Pain at rest: 2-3/10  Pain at worst: 3/10    Objective:     Multi-Segmental Rotation SFMA Breakout:  Active Seated Hip ER (40deg): (R) FN, (L) FN  Passive Seated Hip ER (40deg): (R) FN, (L) FN  Active Seated Hip IR (30deg): (R) FN, (L) FN  Passive Seated Hip IR (30deg): (R) FN, (L) FN  Active Prone Hip ER (40deg): (R) FN, (L) FN  Passive Prone Hip ER (40deg): (R) FN, (L) FN  Active Prone Hip IR (30deg): (R) FN, (L) FN  Passive Prone Hip IR (30deg): (R) FN, (L) FN    Treatment:    Therapeutic Exercise: x 10min  Prone quad stretch: x 30\" (B)   Prone press up: 1 x 10   Cat/camel: x 10   S/L hip ABD: 2 x 10 (B) - cues for hip ext and toes pointing forward - reviewing form/technique  Elastic band hip ABD: 2 x 10 (B), yellow theraband     Neuromuscular Re-education: x 15min  Trigger point release: (L) glute max, glute med   Jude bear crawl hold: 2 x 30\" - cues for neutral spine - reviewing form/technique  90-90 heel taps: 1 x 5 (B) with m/c and v/c for core stabilization/PPT - reviewing form/technique  Seated boat pose: 1 x 5 - reviewing form/technique  Pallof press: 2 x 10 (B), black power band     Manual Therapy: x 15min  Lumbar spine PA accessory joint mobs - L1-L5: Grade 3, 4 x 10\" each   Thoracic spine PA accessory joint mobs - T1-T12: Grade 3, 4 x 10\" each}  Soft tissue mobilization: (R) glutes, low back soft tissue    Provider Interactions With Patient:   Monitoring to ensure safe and effective exercise performance. Assessment: Min Toro is still with reports of low back pain with forward flexion spinal activities. She remains compliant with her HEP focusing on additional hip and core strengthening at home. We reviewed her exercises to ensure that she had proper form/technique. We progressed core and hip strengthening exercises this date which the patient tolerated well. She continues to be motivated to improved and is receptive to instruction, cueing, and feedback. The patient has one remaining appt scheduled. In discussion with the patient, we will progress her to discharge with HEP at that time. Goals:   Short-Term Goals:  1. Patient will improve low back and hip mobility to allow for intermittent pain-free forward bending to reach for and  objects up from the floor. Timeframe: 3 weeks. 2. Patient will improve low back and hip mobility to facilitate erect standing and walking after prolonged sitting. Timeframe: 3 weeks. Long-Term Goals:  1. Patient will improve low back mobility and postural endurance to maintain proper posture with neutral lumbar spine and pelvic position while sitting to facilitate 4 hours of pain-free sitting and desk work for occupational activities. Timeframe: 4-6 weeks. 2. Patient will improve Modified Oswestry Low Back Pain Disability Questionnaire score by 10 points or 10% to indicate a true change in improved function for ADL's and restoring PLF. Timeframe: 4-6 weeks. 3. Patient will demonstrates safe and proper lifting mechanics with intermittent lifting objects of 20lbs from a lower height to facilitate housework and lifting ADL's. Timeframe: 4-6 weeks. 4. Patient will improve low back pain and core strength and stability to facilitate pain-free return to dance for general fitness activities. Timeframe: 6 weeks. Plan: Progress to discharge next session.      HEP: Patient was issued an updated HEP handout 4/24/23 including cat/camel, S/L hip ABD, isome bear crawl hold, seated boat pose, and 90-90 heel taps, and pallof press, to be added to HEP issued 4/17/23 including foam roll massage for hips, back, and spinal extension mobilizations. Charges:  Therex x 1, MT x 1, NMR x 1        Total Timed Treatment: 40min    Total Treatment Time: 40min

## 2023-05-01 ENCOUNTER — OFFICE VISIT (OUTPATIENT)
Dept: PHYSICAL THERAPY | Age: 58
End: 2023-05-01
Attending: FAMILY MEDICINE
Payer: COMMERCIAL

## 2023-05-01 PROCEDURE — 97140 MANUAL THERAPY 1/> REGIONS: CPT

## 2023-05-01 PROCEDURE — 97110 THERAPEUTIC EXERCISES: CPT

## 2023-05-01 PROCEDURE — 97112 NEUROMUSCULAR REEDUCATION: CPT

## 2023-05-01 NOTE — PATIENT INSTRUCTIONS
Thank you for coming to your physical therapy appt! During your appt today you were issued a HEP handout from [employer] which can also be accessed using the information below. The exercises chosen are meant to supplement the treatment you received and reinforce the progress made in physical therapy. It is important to stay consistent with your exercises to help facilitate and maximize your recovery! View at [email or web address]. [employer]Sick using code: [email or web address]

## 2023-05-12 ENCOUNTER — OFFICE VISIT (OUTPATIENT)
Dept: FAMILY MEDICINE CLINIC | Facility: CLINIC | Age: 58
End: 2023-05-12
Payer: COMMERCIAL

## 2023-05-12 ENCOUNTER — HOSPITAL ENCOUNTER (OUTPATIENT)
Dept: GENERAL RADIOLOGY | Age: 58
Discharge: HOME | End: 2023-05-12
Attending: FAMILY MEDICINE
Payer: COMMERCIAL

## 2023-05-12 VITALS
WEIGHT: 126 LBS | TEMPERATURE: 98 F | BODY MASS INDEX: 20.74 KG/M2 | DIASTOLIC BLOOD PRESSURE: 62 MMHG | RESPIRATION RATE: 18 BRPM | HEIGHT: 65.35 IN | HEART RATE: 66 BPM | SYSTOLIC BLOOD PRESSURE: 102 MMHG | OXYGEN SATURATION: 98 %

## 2023-05-12 DIAGNOSIS — M54.50 ACUTE RIGHT-SIDED LOW BACK PAIN WITHOUT SCIATICA: ICD-10-CM

## 2023-05-12 DIAGNOSIS — Z00.00 ROUTINE GENERAL MEDICAL EXAMINATION AT A HEALTH CARE FACILITY: Primary | ICD-10-CM

## 2023-05-12 DIAGNOSIS — Z23 NEED FOR VACCINATION: ICD-10-CM

## 2023-05-12 PROCEDURE — 99213 OFFICE O/P EST LOW 20 MIN: CPT | Performed by: FAMILY MEDICINE

## 2023-05-12 PROCEDURE — 72110 X-RAY EXAM L-2 SPINE 4/>VWS: CPT | Performed by: FAMILY MEDICINE

## 2023-05-12 PROCEDURE — 99396 PREV VISIT EST AGE 40-64: CPT | Performed by: FAMILY MEDICINE

## 2023-05-12 PROCEDURE — 3074F SYST BP LT 130 MM HG: CPT | Performed by: FAMILY MEDICINE

## 2023-05-12 PROCEDURE — 3008F BODY MASS INDEX DOCD: CPT | Performed by: FAMILY MEDICINE

## 2023-05-12 PROCEDURE — 3078F DIAST BP <80 MM HG: CPT | Performed by: FAMILY MEDICINE

## 2023-05-12 PROCEDURE — 90750 HZV VACC RECOMBINANT IM: CPT | Performed by: FAMILY MEDICINE

## 2023-05-12 PROCEDURE — 90471 IMMUNIZATION ADMIN: CPT | Performed by: FAMILY MEDICINE

## 2023-05-16 ENCOUNTER — TELEPHONE (OUTPATIENT)
Dept: FAMILY MEDICINE CLINIC | Facility: CLINIC | Age: 58
End: 2023-05-16

## 2023-05-16 DIAGNOSIS — M54.50 ACUTE RIGHT-SIDED LOW BACK PAIN WITHOUT SCIATICA: Primary | ICD-10-CM

## 2023-05-16 NOTE — TELEPHONE ENCOUNTER
Juan Carlos Rushing  X-ray of lumbar spine is normal, if your pain persists recommend you see a physiatrist or ortho. Written by Ashley Rubalcava MD on 5/15/2023 10:36 AM CDT    Triage call transferred. Spoke with pt and informed of normal lumbar XR. Pt indicates still in pain, better when laying down. Pt requesting referral/provider recommendation as required by insurance. Informed will relay PCP request.   Pt requesting contact info to [email or web address] and a call back once done. Pt aware PCP will RTC tomorrow (5/17). No further questions or concerns. Pt verbalized understanding and agreed with POC. Ortho referral pended for your approval if ok. Dr. Richard Navas? Please review and advise. Thank you.

## 2023-05-18 ENCOUNTER — TELEPHONE (OUTPATIENT)
Dept: ORTHOPEDICS CLINIC | Facility: CLINIC | Age: 58
End: 2023-05-18

## 2023-05-18 NOTE — TELEPHONE ENCOUNTER
Imaging was completed for this patient for a Rt Lower Back Pain, but it needs to be reviewed to see if additional imaging is needed. If so, please enter the appropriate RX and let the patient know to come in before their appointment to complete the additional imaging. Thank you!     Future Appointments   Date Time Provider Benja Israel   5/23/2023  8:40 AM Eva Garcia MD [facility]

## 2023-05-23 ENCOUNTER — OFFICE VISIT (OUTPATIENT)
Dept: ORTHOPEDICS CLINIC | Facility: CLINIC | Age: 58
End: 2023-05-23
Payer: COMMERCIAL

## 2023-05-23 VITALS — WEIGHT: 137 LBS | BODY MASS INDEX: 22.82 KG/M2 | HEIGHT: 65 IN

## 2023-05-23 DIAGNOSIS — G89.29 CHRONIC BILATERAL LOW BACK PAIN WITH RIGHT-SIDED SCIATICA: Primary | ICD-10-CM

## 2023-05-23 DIAGNOSIS — M54.41 CHRONIC BILATERAL LOW BACK PAIN WITH RIGHT-SIDED SCIATICA: Primary | ICD-10-CM

## 2023-05-23 PROCEDURE — 99204 OFFICE O/P NEW MOD 45 MIN: CPT | Performed by: STUDENT IN AN ORGANIZED HEALTH CARE EDUCATION/TRAINING PROGRAM

## 2023-05-23 PROCEDURE — 3008F BODY MASS INDEX DOCD: CPT | Performed by: STUDENT IN AN ORGANIZED HEALTH CARE EDUCATION/TRAINING PROGRAM

## 2023-05-23 RX ORDER — MELOXICAM 15 MG/1
15 TABLET ORAL DAILY
Qty: 30 TABLET | Refills: 0 | Status: SHIPPED | OUTPATIENT
Start: 2023-05-23

## 2023-06-14 ENCOUNTER — TELEPHONE (OUTPATIENT)
Dept: FAMILY MEDICINE CLINIC | Facility: CLINIC | Age: 58
End: 2023-06-14

## 2023-06-16 ENCOUNTER — HOSPITAL ENCOUNTER (OUTPATIENT)
Dept: MRI IMAGING | Age: 58
Discharge: HOME | End: 2023-06-16
Attending: STUDENT IN AN ORGANIZED HEALTH CARE EDUCATION/TRAINING PROGRAM
Payer: COMMERCIAL

## 2023-06-16 DIAGNOSIS — M54.41 CHRONIC BILATERAL LOW BACK PAIN WITH RIGHT-SIDED SCIATICA: ICD-10-CM

## 2023-06-16 DIAGNOSIS — G89.29 CHRONIC BILATERAL LOW BACK PAIN WITH RIGHT-SIDED SCIATICA: ICD-10-CM

## 2023-06-16 PROCEDURE — 72148 MRI LUMBAR SPINE W/O DYE: CPT | Performed by: STUDENT IN AN ORGANIZED HEALTH CARE EDUCATION/TRAINING PROGRAM

## 2024-03-11 ENCOUNTER — NURSE TRIAGE (OUTPATIENT)
Dept: FAMILY MEDICINE CLINIC | Facility: CLINIC | Age: 59
End: 2024-03-11

## 2024-03-11 NOTE — TELEPHONE ENCOUNTER
Pt c/o chest pain x's 2 weeks, said pain is getting worse denies any other symptoms, called transferred to triage

## 2024-03-11 NOTE — TELEPHONE ENCOUNTER
Action Requested: Summary for Provider     []  Critical Lab, Recommendations Needed  [] Need Additional Advice  [x]   FYI    []   Need Orders  [] Need Medications Sent to Pharmacy  []  Other     SUMMARY: RN accepted call from PSR, pt reporting chest pain x2 weeks.  Chest pain protocol utilized below.  Pt denied symptoms for ED/ICC disposition, states she feels stable and overall well apart from the pain episodes.  Offered pt soonest available appt and educated on importance of ED/UC if symptoms change/worsen, pt verbalized understanding.  Forwarding to MD for FYI.    Future Appointments   Date Time Provider Department Center   3/12/2024  7:40 AM Zhanna Jackson MD [facility]     Reason for call: Acute (Chest pain)  Onset: Data Unavailable               Reason for Disposition   Chest pain(s) lasting a few seconds persists > 3 days    Answer Assessment - Initial Assessment Questions  1. LOCATION: \"Where does it hurt?\"        Left side of breast/shoulder  2. RADIATION: \"Does the pain go anywhere else?\" (e.g., into neck, jaw, arms, back)      No  3. ONSET: \"When did the chest pain begin?\" (Minutes, hours or days)       2 weeks  4. PATTERN \"Does the pain come and go, or has it been constant since it started?\"  \"Does it get worse with exertion?\"       Comes and goes, is worse at night  5. DURATION: \"How long does it last\" (e.g., seconds, minutes, hours)      Several minutes  6. SEVERITY: \"How bad is the pain?\"  (e.g., Scale 1-10; mild, moderate, or severe)     - MILD (1-3): doesn't interfere with normal activities      - MODERATE (4-7): interferes with normal activities or awakens from sleep     - SEVERE (8-10): excruciating pain, unable to do any normal activities        4  7. CARDIAC RISK FACTORS: \"Do you have any history of heart problems or risk factors for heart disease?\" (e.g., angina, prior heart attack; diabetes, high blood pressure, high cholesterol, smoker, or strong family history of heart disease)       No  8. PULMONARY RISK FACTORS: \"Do you have any history of lung disease?\"  (e.g., blood clots in lung, asthma, emphysema, birth control pills)      No  9. CAUSE: \"What do you think is causing the chest pain?\"      Unsure  10. OTHER SYMPTOMS: \"Do you have any other symptoms?\" (e.g., dizziness, nausea, vomiting, sweating, fever, difficulty breathing, cough)        No  11. PREGNANCY: \"Is there any chance you are pregnant?\" \"When was your last menstrual period?\"        No    Protocols used: Chest Pain-A-[place]     Home

## 2024-03-12 ENCOUNTER — OFFICE VISIT (OUTPATIENT)
Dept: FAMILY MEDICINE CLINIC | Facility: CLINIC | Age: 59
End: 2024-03-12
Payer: COMMERCIAL

## 2024-03-12 VITALS
HEART RATE: 62 BPM | WEIGHT: 130 LBS | HEIGHT: 65 IN | BODY MASS INDEX: 21.66 KG/M2 | OXYGEN SATURATION: 98 % | DIASTOLIC BLOOD PRESSURE: 62 MMHG | SYSTOLIC BLOOD PRESSURE: 98 MMHG | TEMPERATURE: 98 F | RESPIRATION RATE: 18 BRPM

## 2024-03-12 DIAGNOSIS — Z13.1 SCREENING FOR DIABETES MELLITUS: ICD-10-CM

## 2024-03-12 DIAGNOSIS — Z12.31 ENCOUNTER FOR SCREENING MAMMOGRAM FOR MALIGNANT NEOPLASM OF BREAST: ICD-10-CM

## 2024-03-12 DIAGNOSIS — R07.9 CHEST PAIN, UNSPECIFIED TYPE: Primary | ICD-10-CM

## 2024-03-12 DIAGNOSIS — Z00.00 LABORATORY EXAMINATION ORDERED AS PART OF A ROUTINE GENERAL MEDICAL EXAMINATION: ICD-10-CM

## 2024-03-12 LAB
ATRIAL RATE: 52 BPM
P AXIS: -29 DEGREES
P-R INTERVAL: 130 MS
Q-T INTERVAL: 442 MS
QRS DURATION: 80 MS
QTC CALCULATION (BEZET): 411 MS
R AXIS: 84 DEGREES
T AXIS: 42 DEGREES
VENTRICULAR RATE: 52 BPM

## 2024-03-12 PROCEDURE — 99213 OFFICE O/P EST LOW 20 MIN: CPT | Performed by: FAMILY MEDICINE

## 2024-03-12 PROCEDURE — 93000 ELECTROCARDIOGRAM COMPLETE: CPT | Performed by: FAMILY MEDICINE

## 2024-03-12 NOTE — PROGRESS NOTES
Geno Ramsey is a 58 year old female.  Chief Complaint   Patient presents with    Chest Pain     Left side for 2 weeks     HPI:   Geno Ramsey is a 58 year old female with no significant past medical history complaining of left sided, intermittent chest pain for the past 2 weeks, states happens randomly when she is sitting or moves a certain way, lasts for a minute and resolves.  Denies dizziness, SOB or diaphoresis with the chest pain. Denies any radiation of pain.    ALLERGY:   No Known Allergies    MEDICATIONS:     No current outpatient medications on file.      Past Medical History:   Diagnosis Date    Ganglion of joint     History of pregnancy     childbirth    Other decreased white blood cell count     Pain in joint, hand     Pain in joint, lower leg     Pain in or around eye       Social History:  Social History     Socioeconomic History    Marital status:      Spouse name: Cale    Number of children: 2   Occupational History    Occupation:      Employer: [employer]   Tobacco Use    Smoking status: Never    Smokeless tobacco: Never   Vaping Use    Vaping Use: Never used   Substance and Sexual Activity    Alcohol use: No    Drug use: No    Sexual activity: Yes     Partners: Male   Other Topics Concern    Blood Transfusions No    Caffeine Concern No     Comment: occasional tea    Occupational Exposure No    Hobby Hazards No    Sleep Concern Yes     Comment: occasional early awakening 5AM    Stress Concern No    Weight Concern No    Special Diet No     Comment: higher salt.     Exercise Yes     Comment: walks an hour & 40 minutes daily    Seat Belt Yes   Social History Narrative    Works at [employer]. Has daughter graduated [employer], working in [place] in 2017. Son in college sophomore at [employer].         REVIEW OF SYSTEMS:   GENERAL HEALTH: feels well otherwise  SKIN: denies any unusual skin lesions or rashes  RESPIRATORY: denies shortness of breath with  exertion  CARDIOVASCULAR: denies palpitations  GI: denies abdominal pain and denies heartburn  NEURO: denies headaches    EXAM:   BP 98/62   Pulse 62   Temp 97.8 °F (36.6 °C) (Temporal)   Resp 18   Ht 5' 5\" (1.651 m)   Wt 130 lb (59 kg)   SpO2 98%   BMI 21.63 kg/m²   GENERAL: well developed, well nourished,in no apparent distress  SKIN: no rashes,no suspicious lesions  CHEST: mild tenderness to palpation over the left chest wall, 3rd, 4th intercostal space  LUNGS: clear to auscultation  CARDIO: RRR without murmur  EXTREMITIES: no cyanosis, clubbing or edema    ASSESSMENT AND PLAN:   Geno was seen today for chest pain.    Diagnoses and all orders for this visit:    Chest pain, unspecified type  -     EKG with interpretation and Report -IN OFFICE [00380]-   sinus bradycardia, normal axis and intervals, no ST/T changes  -     reassured patient likely muscular chest pain.    Encounter for screening mammogram for malignant neoplasm of breast  -     [facility] LI 2D+3D SCREENING BILAT (CPT=77067/07851); Future    Laboratory examination ordered as part of a routine general medical examination  -     CBC With Differential With Platelet; Future  -     Assay, Thyroid Stim Hormone; Future  -     Lipid Panel; Future  -     Comp Metabolic Panel (14); Future    Screening for diabetes mellitus  -     Hemoglobin A1C [E]; Future     Return for annual.    The 21st Century Cures Act makes medical notes like these available to patients in the interest of transparency. Please be advised this is a medical document. Medical documents are intended to carry relevant information, facts as evident, and the clinical opinion of the practitioner. The medical note is intended as peer to peer communication and may appear blunt or direct. It is written in medical language and may contain abbreviations or verbiage that are unfamiliar.

## 2024-05-28 ENCOUNTER — HOSPITAL ENCOUNTER (OUTPATIENT)
Dept: MAMMOGRAPHY | Age: 59
Discharge: HOME | End: 2024-05-28
Attending: FAMILY MEDICINE

## 2024-05-28 DIAGNOSIS — Z12.31 ENCOUNTER FOR SCREENING MAMMOGRAM FOR MALIGNANT NEOPLASM OF BREAST: ICD-10-CM

## 2024-05-28 PROCEDURE — 77067 SCR MAMMO BI INCL CAD: CPT | Performed by: FAMILY MEDICINE

## 2024-05-28 PROCEDURE — 77063 BREAST TOMOSYNTHESIS BI: CPT | Performed by: FAMILY MEDICINE

## 2024-06-25 ENCOUNTER — OFFICE VISIT (OUTPATIENT)
Dept: FAMILY MEDICINE CLINIC | Facility: CLINIC | Age: 59
End: 2024-06-25

## 2024-06-25 VITALS
DIASTOLIC BLOOD PRESSURE: 72 MMHG | HEART RATE: 67 BPM | TEMPERATURE: 98 F | RESPIRATION RATE: 18 BRPM | WEIGHT: 127 LBS | OXYGEN SATURATION: 98 % | HEIGHT: 65 IN | SYSTOLIC BLOOD PRESSURE: 100 MMHG | BODY MASS INDEX: 21.16 KG/M2

## 2024-06-25 DIAGNOSIS — Z12.4 SCREENING FOR CERVICAL CANCER: ICD-10-CM

## 2024-06-25 DIAGNOSIS — E04.1 THYROID NODULE: ICD-10-CM

## 2024-06-25 DIAGNOSIS — Z13.1 SCREENING FOR DIABETES MELLITUS: ICD-10-CM

## 2024-06-25 DIAGNOSIS — Z78.0 POSTMENOPAUSAL: ICD-10-CM

## 2024-06-25 DIAGNOSIS — Z00.00 ROUTINE GENERAL MEDICAL EXAMINATION AT A HEALTH CARE FACILITY: Primary | ICD-10-CM

## 2024-06-25 PROCEDURE — 87624 HPV HI-RISK TYP POOLED RSLT: CPT | Performed by: FAMILY MEDICINE

## 2024-06-25 PROCEDURE — 99000 SPECIMEN HANDLING OFFICE-LAB: CPT | Performed by: FAMILY MEDICINE

## 2024-06-25 PROCEDURE — 99396 PREV VISIT EST AGE 40-64: CPT | Performed by: FAMILY MEDICINE

## 2024-06-25 PROCEDURE — 88175 CYTOPATH C/V AUTO FLUID REDO: CPT | Performed by: FAMILY MEDICINE

## 2024-06-25 NOTE — PROGRESS NOTES
Geno Ramsey is a 58 year old female.  Chief Complaint   Patient presents with    Physical     HPI:   Geno Ramsey is a 58 year old female with history seen for her annual physical and pap.  Does do breast self-exam, no family history of breast cancer, mammogram done last month was normal.  Colonoscopy done in 2017 next due in 2027.    Healthy diet and tries to exercise.    Patient has no concerns this visit.    PAST MEDICAL HISTORY:     Past Medical History:    Ganglion of joint    History of pregnancy    childbirth    Other decreased white blood cell count    Pain in joint, hand    Pain in joint, lower leg    Pain in or around eye     PAST SURGICAL HISTORY:   History reviewed. No pertinent surgical history.  ALLERGY:   No Known Allergies  MEDICATIONS:     No current outpatient medications on file.     FAMILY HISTORY:     Family History   Problem Relation Age of Onset    Hypertension Sister        SOCIAL HISTORY:     Social History     Socioeconomic History    Marital status:      Spouse name: Cale    Number of children: 2   Occupational History    Occupation:      Employer: [employer]   Tobacco Use    Smoking status: Never    Smokeless tobacco: Never   Vaping Use    Vaping status: Never Used   Substance and Sexual Activity    Alcohol use: No    Drug use: No    Sexual activity: Yes     Partners: Male   Other Topics Concern    Blood Transfusions No    Caffeine Concern No     Comment: occasional tea    Occupational Exposure No    Hobby Hazards No    Sleep Concern Yes     Comment: occasional early awakening 5AM    Stress Concern No    Weight Concern No    Special Diet No     Comment: higher salt.     Exercise Yes     Comment: walks an hour & 40 minutes daily    Seat Belt Yes   Social History Narrative    Works at [employer]. Has daughter graduated [employer], working in [place] in 2017. Son in college sophomore at [employer].      REVIEW OF SYSTEMS:   Review of Systems    Constitutional:  Negative for appetite change, fatigue, fever and unexpected weight change.   HENT:  Negative for congestion, ear pain, hearing loss and sore throat.    Eyes:  Negative for discharge, redness and visual disturbance.   Respiratory:  Negative for cough, chest tightness and shortness of breath.    Cardiovascular:  Negative for chest pain and palpitations.   Gastrointestinal:  Negative for abdominal pain, blood in stool, constipation and nausea.   Endocrine: Negative for cold intolerance, heat intolerance and polyuria.   Genitourinary:  Negative for difficulty urinating, dysuria, frequency and urgency.   Musculoskeletal:  Negative for arthralgias, back pain, gait problem, joint swelling and myalgias.   Skin:  Negative for rash.   Allergic/Immunologic: Negative for food allergies.   Neurological:  Negative for dizziness, weakness, numbness and headaches.   Hematological:  Negative for adenopathy. Does not bruise/bleed easily.   Psychiatric/Behavioral:  Negative for dysphoric mood and sleep disturbance. The patient is not nervous/anxious.         PHYSICAL EXAM:   /72   Pulse 67   Temp 98 °F (36.7 °C) (Temporal)   Resp 18   Ht 5' 5\" (1.651 m)   Wt 127 lb (57.6 kg)   SpO2 98%   BMI 21.13 kg/m²     Physical Exam  Constitutional:       General: She is not in acute distress.     Appearance: Normal appearance. She is well-developed and normal weight.   HENT:      Head: Normocephalic and atraumatic.      Right Ear: Tympanic membrane and external ear normal.      Left Ear: Tympanic membrane and external ear normal.      Nose: Nose normal.   Eyes:      Extraocular Movements: Extraocular movements intact.      Conjunctiva/sclera: Conjunctivae normal.      Pupils: Pupils are equal, round, and reactive to light.   Neck:      Thyroid: Thyromegaly (thyroid nodule) present.   Cardiovascular:      Rate and Rhythm: Normal rate and regular rhythm.      Heart sounds: Normal heart sounds. No murmur  heard.  Pulmonary:      Effort: Pulmonary effort is normal. No respiratory distress.      Breath sounds: Normal breath sounds.   Chest:      Chest wall: No tenderness.   Breasts:     Right: Normal. No swelling (texture dense), inverted nipple, mass, nipple discharge, skin change or tenderness.      Left: Normal. No swelling (texture dense), inverted nipple, mass, nipple discharge, skin change or tenderness.   Abdominal:      General: Abdomen is flat. Bowel sounds are normal. There is no distension.      Palpations: Abdomen is soft. There is no hepatomegaly, splenomegaly or mass.      Tenderness: There is no abdominal tenderness.      Hernia: No hernia is present. There is no hernia in the left inguinal area or right inguinal area.   Genitourinary:     General: Normal vulva.      Exam position: Lithotomy position.      Pubic Area: No rash.       Labia:         Right: No rash or lesion.         Left: No rash or lesion.       Urethra: No prolapse, urethral pain, urethral swelling or urethral lesion.      Vagina: Normal. No vaginal discharge, erythema, tenderness or lesions.      Cervix: Normal. No cervical motion tenderness, discharge, friability, lesion, erythema or cervical bleeding.      Uterus: Normal. Not deviated, not enlarged, not fixed, not tender and no uterine prolapse.       Adnexa: Right adnexa normal and left adnexa normal.        Right: No mass, tenderness or fullness.          Left: No mass, tenderness or fullness.        Rectum: External hemorrhoid present.   Musculoskeletal:      Cervical back: Normal range of motion and neck supple.   Lymphadenopathy:      Cervical: No cervical adenopathy.      Upper Body:      Right upper body: No supraclavicular, axillary or pectoral adenopathy.      Left upper body: No supraclavicular, axillary or pectoral adenopathy.      Lower Body: No right inguinal adenopathy. No left inguinal adenopathy.   Skin:     General: Skin is warm.      Findings: No rash.    Neurological:      General: No focal deficit present.      Mental Status: She is alert and oriented to person, place, and time.      Deep Tendon Reflexes: Reflexes are normal and symmetric.   Psychiatric:         Mood and Affect: Mood normal.         Behavior: Behavior normal.         Thought Content: Thought content normal.         Judgment: Judgment normal.          ASSESSMENT AND PLAN:   Geno was seen today for physical.    Diagnoses and all orders for this visit:    Routine general medical examination at a health care facility  -     CBC With Differential With Platelet; Future  -     Lipid Panel; Future  -     Comp Metabolic Panel (14); Future  -     TSH and Free T4 [E]; Future    Screening for cervical cancer  -     ThinPrep PAP Smear; Future  -     Hpv Dna  High Risk , Thin Prep Collect; Future  -     ThinPrep PAP Smear  -     Hpv Dna  High Risk , Thin Prep Collect    Thyroid nodule  -     US THYROID (CPT=76536); Future  -     Thyroid peroxidase & thyroglobulin ab [E]; Future  -     TSH and Free T4 [E]; Future    Postmenopausal  -     XR DEXA BONE DENSITOMETRY (CPT=77080); Future    Screening for diabetes mellitus  -     Hemoglobin A1C [E]; Future    Age appropriate anticipatory guidance reviewed  Health Maintenance   Topic Date Due    Annual Physical  Done today    Pap Smear  11/08/2024    Influenza Vaccine (Season Ended) 10/01/2024    Mammogram  05/28/2025    DTaP,Tdap,and Td Vaccines (2 - Td or Tdap) 08/17/2027    Colorectal Cancer Screening  10/11/2027    Annual Depression Screening  Completed    Zoster Vaccines  Completed    COVID-19 Vaccine  Completed    Pneumococcal Vaccine: Birth to 64yrs  Aged Out        The 21st Century Cures Act makes medical notes like these available to patients in the interest of transparency. Please be advised this is a medical document. Medical documents are intended to carry relevant information, facts as evident, and the clinical opinion of the practitioner. The medical  note is intended as peer to peer communication and may appear blunt or direct. It is written in medical language and may contain abbreviations or verbiage that are unfamiliar.

## 2024-06-26 LAB — HPV E6+E7 MRNA CVX QL NAA+PROBE: NEGATIVE

## 2024-06-30 LAB
.: NORMAL
.: NORMAL

## 2024-07-03 ENCOUNTER — LAB ENCOUNTER (OUTPATIENT)
Dept: LAB | Age: 59
End: 2024-07-03
Attending: FAMILY MEDICINE
Payer: COMMERCIAL

## 2024-07-03 DIAGNOSIS — Z00.00 LABORATORY EXAMINATION ORDERED AS PART OF A ROUTINE GENERAL MEDICAL EXAMINATION: ICD-10-CM

## 2024-07-03 DIAGNOSIS — E04.1 THYROID NODULE: ICD-10-CM

## 2024-07-03 DIAGNOSIS — Z13.1 SCREENING FOR DIABETES MELLITUS: ICD-10-CM

## 2024-07-03 DIAGNOSIS — Z00.00 ROUTINE GENERAL MEDICAL EXAMINATION AT A HEALTH CARE FACILITY: ICD-10-CM

## 2024-07-03 LAB
ALBUMIN SERPL-MCNC: 4 G/DL (ref 3.4–5)
ALBUMIN/GLOB SERPL: 1.3 {RATIO} (ref 1–2)
ALP LIVER SERPL-CCNC: 68 U/L
ALT SERPL-CCNC: 21 U/L
ANION GAP SERPL CALC-SCNC: 4 MMOL/L (ref 0–18)
AST SERPL-CCNC: 11 U/L (ref 15–37)
BASOPHILS # BLD AUTO: 0.02 X10(3) UL (ref 0–0.2)
BASOPHILS NFR BLD AUTO: 0.6 %
BILIRUB SERPL-MCNC: 1.4 MG/DL (ref 0.1–2)
BUN BLD-MCNC: 20 MG/DL (ref 9–23)
CALCIUM BLD-MCNC: 9.4 MG/DL (ref 8.5–10.1)
CHLORIDE SERPL-SCNC: 110 MMOL/L (ref 98–112)
CHOLEST SERPL-MCNC: 162 MG/DL (ref ?–200)
CO2 SERPL-SCNC: 29 MMOL/L (ref 21–32)
CREAT BLD-MCNC: 0.89 MG/DL
EGFRCR SERPLBLD CKD-EPI 2021: 75 ML/MIN/1.73M2 (ref 60–?)
EOSINOPHIL # BLD AUTO: 0.09 X10(3) UL (ref 0–0.7)
EOSINOPHIL NFR BLD AUTO: 2.8 %
ERYTHROCYTE [DISTWIDTH] IN BLOOD BY AUTOMATED COUNT: 12.1 %
EST. AVERAGE GLUCOSE BLD GHB EST-MCNC: 117 MG/DL (ref 68–126)
FASTING PATIENT LIPID ANSWER: YES
FASTING STATUS PATIENT QL REPORTED: YES
GLOBULIN PLAS-MCNC: 3.1 G/DL (ref 2.8–4.4)
GLUCOSE BLD-MCNC: 82 MG/DL (ref 70–99)
HBA1C MFR BLD: 5.7 % (ref ?–5.7)
HCT VFR BLD AUTO: 39.7 %
HDLC SERPL-MCNC: 59 MG/DL (ref 40–59)
HGB BLD-MCNC: 13.3 G/DL
IMM GRANULOCYTES # BLD AUTO: 0.01 X10(3) UL (ref 0–1)
IMM GRANULOCYTES NFR BLD: 0.3 %
LDLC SERPL CALC-MCNC: 83 MG/DL (ref ?–100)
LYMPHOCYTES # BLD AUTO: 1.24 X10(3) UL (ref 1–4)
LYMPHOCYTES NFR BLD AUTO: 38.4 %
MCH RBC QN AUTO: 32 PG (ref 26–34)
MCHC RBC AUTO-ENTMCNC: 33.5 G/DL (ref 31–37)
MCV RBC AUTO: 95.7 FL
MONOCYTES # BLD AUTO: 0.34 X10(3) UL (ref 0.1–1)
MONOCYTES NFR BLD AUTO: 10.5 %
NEUTROPHILS # BLD AUTO: 1.53 X10 (3) UL (ref 1.5–7.7)
NEUTROPHILS # BLD AUTO: 1.53 X10(3) UL (ref 1.5–7.7)
NEUTROPHILS NFR BLD AUTO: 47.4 %
NONHDLC SERPL-MCNC: 103 MG/DL (ref ?–130)
OSMOLALITY SERPL CALC.SUM OF ELEC: 298 MOSM/KG (ref 275–295)
PLATELET # BLD AUTO: 169 10(3)UL (ref 150–450)
POTASSIUM SERPL-SCNC: 3.9 MMOL/L (ref 3.5–5.1)
PROT SERPL-MCNC: 7.1 G/DL (ref 6.4–8.2)
RBC # BLD AUTO: 4.15 X10(6)UL
SODIUM SERPL-SCNC: 143 MMOL/L (ref 136–145)
T4 FREE SERPL-MCNC: 1.3 NG/DL (ref 0.8–1.7)
THYROGLOB SERPL-MCNC: 42 U/ML (ref ?–60)
THYROPEROXIDASE AB SERPL-ACNC: 62 U/ML (ref ?–60)
TRIGL SERPL-MCNC: 112 MG/DL (ref 30–149)
TSI SER-ACNC: 1.4 MIU/ML (ref 0.36–3.74)
VLDLC SERPL CALC-MCNC: 18 MG/DL (ref 0–30)
WBC # BLD AUTO: 3.2 X10(3) UL (ref 4–11)

## 2024-07-03 PROCEDURE — 84443 ASSAY THYROID STIM HORMONE: CPT

## 2024-07-03 PROCEDURE — 80061 LIPID PANEL: CPT

## 2024-07-03 PROCEDURE — 86376 MICROSOMAL ANTIBODY EACH: CPT

## 2024-07-03 PROCEDURE — 85025 COMPLETE CBC W/AUTO DIFF WBC: CPT

## 2024-07-03 PROCEDURE — 84439 ASSAY OF FREE THYROXINE: CPT

## 2024-07-03 PROCEDURE — 83036 HEMOGLOBIN GLYCOSYLATED A1C: CPT

## 2024-07-03 PROCEDURE — 36415 COLL VENOUS BLD VENIPUNCTURE: CPT

## 2024-07-03 PROCEDURE — 86800 THYROGLOBULIN ANTIBODY: CPT

## 2024-07-03 PROCEDURE — 80053 COMPREHEN METABOLIC PANEL: CPT

## 2025-02-13 ENCOUNTER — NURSE TRIAGE (OUTPATIENT)
Dept: FAMILY MEDICINE CLINIC | Facility: CLINIC | Age: 60
End: 2025-02-13

## 2025-02-13 NOTE — TELEPHONE ENCOUNTER
Action Requested: Summary for Provider     []  Critical Lab, Recommendations Needed  [] Need Additional Advice  [x]   FYI    []   Need Orders  [] Need Medications Sent to Pharmacy  []  Other     SUMMARY: OV 2/14    Reason for call: breast pain  Onset: 2 days    Left breast, sharp pian, lasting seconds and subsided. Pain when pressed upon. No redness. Not hot to touch. No nipple discharge.   Noted in comments of appointment- \"fell on chest\", confirmed with pt did not fall on chest. No known injury.   Advised to keep appt as scheduled tomorrow (2/14) with PCP. Reviewed care advice.   No further questions or concerns. Pt verbalized understanding and agreed with POC.    Reason for Disposition   Breast pain and cause is not known    Protocols used: Breast Symptoms-A-OH

## 2025-02-14 ENCOUNTER — OFFICE VISIT (OUTPATIENT)
Dept: FAMILY MEDICINE CLINIC | Facility: CLINIC | Age: 60
End: 2025-02-14
Payer: COMMERCIAL

## 2025-02-14 VITALS
TEMPERATURE: 98 F | WEIGHT: 131.38 LBS | BODY MASS INDEX: 21.89 KG/M2 | RESPIRATION RATE: 16 BRPM | SYSTOLIC BLOOD PRESSURE: 100 MMHG | HEIGHT: 65 IN | OXYGEN SATURATION: 99 % | HEART RATE: 66 BPM | DIASTOLIC BLOOD PRESSURE: 60 MMHG

## 2025-02-14 DIAGNOSIS — N64.4 BREAST PAIN, LEFT: Primary | ICD-10-CM

## 2025-02-14 DIAGNOSIS — E55.9 VITAMIN D DEFICIENCY: ICD-10-CM

## 2025-02-14 DIAGNOSIS — G47.09 OTHER INSOMNIA: ICD-10-CM

## 2025-02-14 PROCEDURE — 99213 OFFICE O/P EST LOW 20 MIN: CPT | Performed by: FAMILY MEDICINE

## 2025-02-14 NOTE — PATIENT INSTRUCTIONS
SLEEP HYGIENE:    Eliminate stimulants like ( Caffeine) after 1 PM, if still with sleeping difficulties, then keep moving back the last caffeine intake  Discontinue daytime naps  Regular exercise improves sleep but do not exercise within 3 hours of your bedtime.  Move dinner to at least 4 hrs before bedtime  No fluids 2 hrs before bedtime.  Keep your bedroom dark and cool  Avoid all electronics before bedtime  Go to bed at the same time daily  White noise like a fan or relaxing music will help  Over the counter Magnesium Glycinate can help  Watch You tube Mac Acosta sleep hypnosis    With depression and insomnia Bright light therapy helps with seasonal depression, 10 L lux located vertically and placed 1 meter away for 30-90 min after awakening    Please consider CBT.

## 2025-02-14 NOTE — PROGRESS NOTES
Family Medicine Progress Note  ASSESSMENT AND PLAN:  Geno Ramsey is a 59 year old female who is here for:     Geno was seen today for sleep problem and breast pain.    Diagnoses and all orders for this visit:    Breast pain, left  -     US BREAST LEFT COMPLETE (CPT=76641); Future  -     if US is normal can try Daily doses of 1000-1,200 IU vitamin E, or 3,000 mg EPO( evening primrose oil), or vitamin E and EPO in combination will help with breast pain.    Vitamin D deficiency  -     Vitamin D [E]; Future    Other insomnia          - sleep hygiene reviewed with patient.       The patient indicates understanding of these issues and agrees to the plan.  Follow-Up: The patient is asked to return in Return if symptoms worsen or fail to improve.  .     Zhanna Jackson MD   02/14/25      CC: Sleep Problem and Breast Pain    HPI:   Geno Ramsey is a 59 year old female who presents for Sleep Problem and Breast Pain     Complaining that she felt sharp pain in the left breast day before yesterday, lasting for a few seconds, happened several times that day, no has some discomfort and has some tenderness in the left upper quadrant. Mammogram done 5/24 was normal.    Trouble falling asleep for the past few weeks. Work stress. Took Magnesium glycinate and helps sometimes.   + work related stress    ALLERGY:     Allergies as of 02/14/2025    (No Known Allergies)     MEDICATIONS:     No current outpatient medications on file.      Past Medical History:    Ganglion of joint    History of pregnancy    childbirth    Other decreased white blood cell count    Pain in joint, hand    Pain in joint, lower leg    Pain in or around eye      Social History:  Social History     Socioeconomic History    Marital status:      Spouse name: Cale    Number of children: 2   Occupational History    Occupation:      Employer: [employer]   Tobacco Use    Smoking status: Never     Passive exposure: Never    Smokeless  tobacco: Never   Vaping Use    Vaping status: Never Used   Substance and Sexual Activity    Alcohol use: No    Drug use: No    Sexual activity: Yes     Partners: Male   Other Topics Concern    Blood Transfusions No    Caffeine Concern No     Comment: occasional tea    Occupational Exposure No    Hobby Hazards No    Sleep Concern Yes     Comment: occasional early awakening 5AM    Stress Concern No    Weight Concern No    Special Diet No     Comment: higher salt.     Exercise Yes     Comment: walks an hour & 40 minutes daily    Seat Belt Yes   Social History Narrative    Works at [employer]. Has daughter graduated [employer], working in [place] in 2017. Son in college sophomore at [employer].         REVIEW OF SYSTEMS:   A comprehensive 10 point review of systems was completed.  Pertinent positives and negatives noted in the the HPI.    EXAM:   /60   Pulse 66   Temp 97.5 °F (36.4 °C) (Temporal)   Resp 16   Ht 5' 5\" (1.651 m)   Wt 131 lb 6 oz (59.6 kg)   SpO2 99%   BMI 21.86 kg/m²   GENERAL: well developed, well nourished,in no apparent distress  SKIN: no rashes,no suspicious lesions  BREAST: left, no skin changes, texture dense, no nipple retraction or discharge, no palpable lump or mass, mild tenderness upper outer quadrant of the breast.  LUNGS: clear to auscultation  CARDIO: RRR without murmur  PSYCH: well groomed, appropriate mood and affect.      NOTE TO PATIENT: The 21st Century Cures Act makes clinical notes like these available to patients in the interest of transparency. Clinical notes are medical documents used by physicians and care providers to communicate with each other. These documents include medical language and terminology, abbreviations, and treatment information that may sound technical and at times possibly unfamiliar. In addition, at times, the verbiage may appear blunt or direct. These documents are one tool providers use to communicate relevant information and clinical opinions  of the care providers in a way that allows common understanding of the clinical context.      Zhanna Jackson MD

## 2025-03-14 ENCOUNTER — LAB ENCOUNTER (OUTPATIENT)
Dept: LAB | Age: 60
End: 2025-03-14
Attending: FAMILY MEDICINE
Payer: COMMERCIAL

## 2025-03-14 DIAGNOSIS — E55.9 VITAMIN D DEFICIENCY: ICD-10-CM

## 2025-03-14 LAB — VIT D+METAB SERPL-MCNC: 32.2 NG/ML (ref 30–100)

## 2025-03-14 PROCEDURE — 36415 COLL VENOUS BLD VENIPUNCTURE: CPT

## 2025-03-14 PROCEDURE — 82306 VITAMIN D 25 HYDROXY: CPT

## 2025-04-09 ENCOUNTER — HOSPITAL ENCOUNTER (OUTPATIENT)
Dept: ULTRASOUND IMAGING | Age: 60
Discharge: HOME | End: 2025-04-09
Attending: FAMILY MEDICINE
Payer: COMMERCIAL

## 2025-04-09 ENCOUNTER — HOSPITAL ENCOUNTER (OUTPATIENT)
Dept: BONE DENSITY | Age: 60
Discharge: HOME | End: 2025-04-09
Attending: FAMILY MEDICINE
Payer: COMMERCIAL

## 2025-04-09 DIAGNOSIS — Z78.0 POSTMENOPAUSAL: ICD-10-CM

## 2025-04-09 DIAGNOSIS — E04.1 THYROID NODULE: ICD-10-CM

## 2025-04-09 PROCEDURE — 76536 US EXAM OF HEAD AND NECK: CPT | Performed by: FAMILY MEDICINE

## 2025-04-09 PROCEDURE — 77080 DXA BONE DENSITY AXIAL: CPT | Performed by: FAMILY MEDICINE

## 2025-04-23 ENCOUNTER — TELEPHONE (OUTPATIENT)
Dept: FAMILY MEDICINE CLINIC | Facility: CLINIC | Age: 60
End: 2025-04-23

## 2025-04-23 DIAGNOSIS — N64.4 BREAST PAIN, LEFT: Primary | ICD-10-CM

## 2025-04-23 NOTE — TELEPHONE ENCOUNTER
Called pt to inform mammo order as per PCP. Provided CS# to schedule. Pt verbalized understanding and agreed with POC.

## 2025-04-23 NOTE — TELEPHONE ENCOUNTER
Patient states she called to scheduled breast US  States they advised her to get an order for a mammogram as her last one was may 2024.    Last mammogram was a screening mammogram.    Please advise if you would like patient to have a screening mammogram or diagnostic as the reason for her breast ultrasound is breast pain

## 2025-05-16 ENCOUNTER — HOSPITAL ENCOUNTER (OUTPATIENT)
Dept: MAMMOGRAPHY | Facility: HOSPITAL | Age: 60
Discharge: HOME | End: 2025-05-16
Attending: FAMILY MEDICINE
Payer: COMMERCIAL

## 2025-05-16 DIAGNOSIS — N64.4 BREAST PAIN, LEFT: ICD-10-CM

## 2025-05-16 PROCEDURE — 77066 DX MAMMO INCL CAD BI: CPT | Performed by: FAMILY MEDICINE

## 2025-05-16 PROCEDURE — 76641 ULTRASOUND BREAST COMPLETE: CPT | Performed by: FAMILY MEDICINE

## 2025-05-16 PROCEDURE — 76642 ULTRASOUND BREAST LIMITED: CPT | Performed by: FAMILY MEDICINE

## 2025-05-16 PROCEDURE — 77062 BREAST TOMOSYNTHESIS BI: CPT | Performed by: FAMILY MEDICINE

## 2025-07-22 ENCOUNTER — NURSE TRIAGE (OUTPATIENT)
Dept: FAMILY MEDICINE CLINIC | Facility: CLINIC | Age: 60
End: 2025-07-22

## 2025-07-22 NOTE — TELEPHONE ENCOUNTER
Action Requested: Summary for Provider     []  Critical Lab, Recommendations Needed  [] Need Additional Advice  [x]   FYI    []   Need Orders  [] Need Medications Sent to Pharmacy  []  Other     SUMMARY: OV 7/24    Reason for call: Knee Pain  Onset: 1 week    Per pt, left knee pain. Worse with walking. Rates pain 6/10. No pain at rest. No known injury. Back of knee pain with bending.   Informed PCP out of the office until August. Offered an appt with another provider:  Future Appointments   Date Time Provider Department Center   7/24/2025  1:45 PM Edmund Jenkins MD [facility]     Reviewed care advice. Advised if develops CP, difficulty breathing, to go to [facility]. No further questions or concerns. Pt verbalized understanding and agreed with POC.    Reason for Disposition   MILD knee pain persists > 7 days    Protocols used: Knee Pain-A-OH

## 2025-07-24 ENCOUNTER — HOSPITAL ENCOUNTER (OUTPATIENT)
Dept: GENERAL RADIOLOGY | Age: 60
Discharge: HOME | End: 2025-07-24
Attending: FAMILY MEDICINE
Payer: COMMERCIAL

## 2025-07-24 ENCOUNTER — OFFICE VISIT (OUTPATIENT)
Dept: FAMILY MEDICINE CLINIC | Facility: CLINIC | Age: 60
End: 2025-07-24
Payer: COMMERCIAL

## 2025-07-24 VITALS
RESPIRATION RATE: 17 BRPM | TEMPERATURE: 97 F | SYSTOLIC BLOOD PRESSURE: 108 MMHG | WEIGHT: 132 LBS | BODY MASS INDEX: 21.99 KG/M2 | HEIGHT: 65 IN | HEART RATE: 60 BPM | OXYGEN SATURATION: 97 % | DIASTOLIC BLOOD PRESSURE: 60 MMHG

## 2025-07-24 DIAGNOSIS — G89.29 CHRONIC PAIN OF LEFT KNEE: ICD-10-CM

## 2025-07-24 DIAGNOSIS — M25.562 CHRONIC PAIN OF LEFT KNEE: Primary | ICD-10-CM

## 2025-07-24 DIAGNOSIS — G89.29 CHRONIC PAIN OF LEFT KNEE: Primary | ICD-10-CM

## 2025-07-24 DIAGNOSIS — M25.562 CHRONIC PAIN OF LEFT KNEE: ICD-10-CM

## 2025-07-24 PROCEDURE — 73562 X-RAY EXAM OF KNEE 3: CPT | Performed by: FAMILY MEDICINE

## 2025-07-24 PROCEDURE — 99213 OFFICE O/P EST LOW 20 MIN: CPT | Performed by: FAMILY MEDICINE

## 2025-07-24 RX ORDER — IBUPROFEN 400 MG/1
400 TABLET, FILM COATED ORAL EVERY 8 HOURS
Qty: 30 TABLET | Refills: 0 | Status: SHIPPED | OUTPATIENT
Start: 2025-07-24

## 2025-07-25 NOTE — PROGRESS NOTES
Subjective:   Geno Ramsey is a 60 year old female who presents for Knee Pain (L knee pain, starting last week//The following individual(s) verbally consented to be recorded using ambient AI listening technology and understand that they can each withdraw their consent to this listening technology at any point by asking the clinician to turn off or pause the recording://Patient name: Geno Ramsey// )       History/Other:   History of Present Illness  Geno Ramsey is a 60 year old female who presents with left knee pain.    She began experiencing pain in her left knee last week, which is exacerbated by bending, straining, or stretching, and is particularly severe when bending and hyperflexion of the knee. The pain is located in the anterior and posterior aspects of the knee. She notes a decrease in pain severity today compared to previous days.    The knee pain affects her sleep, causing discomfort when stretching or bending her leg while lying down. The pain is not significant when sitting but becomes more pronounced with ambulation or weight-bearing. She experiences mild discomfort when ascending or descending stairs.    She has a history of sleep problems and experiences knee pain if she walks excessively. She has taken 'Move Free', an over-the-counter supplement, but not regularly.   Chief Complaint Reviewed and Verified  Nursing Notes Reviewed and   Verified  Tobacco Reviewed  Allergies Reviewed  Medications Reviewed    Problem List Reviewed  Medical History Reviewed  Surgical History   Reviewed  OB Status Reviewed  Family History Reviewed  Social History   Reviewed         Tobacco:  She has never smoked tobacco.    Current Medications[1]           Review of Systems:  Pertinent items are noted in HPI.      Objective:   /60   Pulse 60   Temp 97 °F (36.1 °C) (Temporal)   Resp 17   Ht 5' 5\" (1.651 m)   Wt 132 lb (59.9 kg)   SpO2 97%   BMI 21.97 kg/m²  Estimated body mass index is 21.97 kg/m² as  calculated from the following:    Height as of this encounter: 5' 5\" (1.651 m).    Weight as of this encounter: 132 lb (59.9 kg).  Results         Physical Exam  MUSCULOSKELETAL: Left knee ---tenderness in the left knee on palpation weightbearing. Tenderness in the posterior left knee on hyperflexion      Assessment & Plan:   1. Chronic pain of left knee (Primary)  -Left knee pain began last week, localized to the posterior knee, exacerbated by walking and bending. Suspected Baker's cyst as the source. Over-the-counter supplements tried but not regularly. Inflammation likely causes pain during ambulation due to body weight pressure, even if asymptomatic while sitting.  - Prescribe ibuprofen 400 mg to be taken three times daily after meals at least for five days.  - Order x-ray of the left knee to evaluate for arthritis or other underlying issues.  - Advise to take ibuprofen as needed after the initial five-day course.  - [facility] [address]     XR KNEE (3 VIEWS), LEFT (CPT=73562); Future; Expected date: 07/24/2025  Other orders  -     Ibuprofen; Take 1 tablet (400 mg total) by mouth every 8 (eight) hours.  Dispense: 30 tablet; Refill: 0          No follow-ups on file.        Edmund Jenkins MD, 7/24/2025, 10:32 PM             [1]   Current Outpatient Medications   Medication Sig Dispense Refill    ibuprofen 400 MG Oral Tab Take 1 tablet (400 mg total) by mouth every 8 (eight) hours. 30 tablet 0

## (undated) NOTE — MR AVS SNAPSHOT
After Visit Summary   11/8/2021    Segundo Nunez   MRN: [id number]           Visit Information     Date & Time  11/8/2021 12:00 PM Provider  Maile Valadez MD Department  [place] [address], [facility], [facility].  Phone  21  waiting room and online chat with a doctor face-to-face using your web-cam enabled computer or mobile device wherever you are. Video Visits cost $50 and can be paid hassle-free using a credit, debit, or health savings card. Not active on [employer]?  Ask us h